# Patient Record
Sex: FEMALE | Race: WHITE | NOT HISPANIC OR LATINO | Employment: FULL TIME | URBAN - NONMETROPOLITAN AREA
[De-identification: names, ages, dates, MRNs, and addresses within clinical notes are randomized per-mention and may not be internally consistent; named-entity substitution may affect disease eponyms.]

---

## 2017-02-21 ENCOUNTER — TELEPHONE (OUTPATIENT)
Dept: OBSTETRICS AND GYNECOLOGY | Facility: CLINIC | Age: 32
End: 2017-02-21

## 2017-02-21 RX ORDER — NORGESTIMATE AND ETHINYL ESTRADIOL 0.25-0.035
1 KIT ORAL DAILY
Qty: 28 TABLET | Refills: 7 | Status: SHIPPED | OUTPATIENT
Start: 2017-02-21 | End: 2017-03-21

## 2017-02-21 NOTE — TELEPHONE ENCOUNTER
----- Message from Abby Holman sent at 2/21/2017  9:23 AM EST -----  Contact: PT  THIS IS CHERELLE'S PT.  SHE ISN'T DUE FOR ANNUAL UNTIL October, BUT ASKED IF WE CAN SEND IN REFILLS OF S[facility] TO [facility] IN [place].  THANKS

## 2017-02-24 DIAGNOSIS — N63.0 BREAST MASS: Primary | ICD-10-CM

## 2017-03-31 ENCOUNTER — OFFICE VISIT (OUTPATIENT)
Dept: OBSTETRICS AND GYNECOLOGY | Facility: CLINIC | Age: 32
End: 2017-03-31

## 2017-03-31 VITALS
HEIGHT: 60 IN | DIASTOLIC BLOOD PRESSURE: 72 MMHG | SYSTOLIC BLOOD PRESSURE: 118 MMHG | WEIGHT: 145 LBS | BODY MASS INDEX: 28.47 KG/M2

## 2017-03-31 DIAGNOSIS — A59.01 TRICHOMONAS VAGINITIS: Primary | ICD-10-CM

## 2017-03-31 PROCEDURE — 87210 SMEAR WET MOUNT SALINE/INK: CPT | Performed by: PHYSICIAN ASSISTANT

## 2017-03-31 PROCEDURE — 99213 OFFICE O/P EST LOW 20 MIN: CPT | Performed by: PHYSICIAN ASSISTANT

## 2017-03-31 RX ORDER — METRONIDAZOLE 500 MG/1
500 TABLET ORAL 2 TIMES DAILY
Qty: 14 TABLET | Refills: 0 | Status: SHIPPED | OUTPATIENT
Start: 2017-03-31 | End: 2017-04-07

## 2017-03-31 RX ORDER — NORGESTIMATE AND ETHINYL ESTRADIOL 7DAYSX3 28
1 KIT ORAL DAILY
COMMUNITY
End: 2017-08-03 | Stop reason: ALTCHOICE

## 2017-03-31 NOTE — PROGRESS NOTES
"Subjective   Chief Complaint   Patient presents with   • Vaginal Discharge   • Difficulty Urinating     occasional     /72  Ht 60\" (152.4 cm)  Wt 145 lb (65.8 kg)  LMP 2017  BMI 28.32 kg/m2   Dinesh Valera is a 31 y.o. year old  presenting to be seen for vaginal irritation and discharge  Started about 1 month ago as was returning from the beach  Had itching and discharge-though was yeast and took one diflucan from a friend--symptoms persist but now heavier yellow discharge and itching       Past Medical History:   Diagnosis Date   • Hypertension during pregnancy    • Hypokalemia    • Lactose intolerance    • Lactose intolerance in adult    • Migraines    • Ovarian cyst    • TIA (transient ischemic attack)    • Vision problems         Current Outpatient Prescriptions:   •  norgestimate-ethinyl estradiol (ORTHO TRI-CYCLEN, 28,) 0.18/0.215/0.25 MG-35 MCG per tablet, Take 1 tablet by mouth Daily., Disp: , Rfl:   •  azithromycin (ZITHROMAX Z-RUCHI) 250 MG tablet, Take 2 tablets the first day, then 1 tablet daily for 4 days., Disp: 6 tablet, Rfl: 0  •  metroNIDAZOLE (FLAGYL) 500 MG tablet, Take 1 tablet by mouth 2 (Two) Times a Day for 7 days., Disp: 14 tablet, Rfl: 0   No Known Allergies   Past Surgical History:   Procedure Laterality Date   •  SECTION     • CHOLECYSTECTOMY        Social History     Social History   • Marital status:      Spouse name: N/A   • Number of children: N/A   • Years of education: N/A     Occupational History   • Not on file.     Social History Main Topics   • Smoking status: Never Smoker   • Smokeless tobacco: Not on file   • Alcohol use No   • Drug use: No   • Sexual activity: Defer     Other Topics Concern   • Not on file     Social History Narrative      Family History   Problem Relation Age of Onset   • Fabry's disease Mother    • Hypertension Mother    • Rheum arthritis Mother    • Arthritis Father    • Diabetes Father    • " Migraines Father    • Diabetes Brother    • Migraines Brother    • Fabry's disease Brother    • Seizures Daughter    • Basal cell carcinoma Paternal Grandfather    • Colon polyps Paternal Grandfather    • Diabetes Other      Grandmother   • Skin cancer Other      Grandfather, Uncle   • Stomach cancer Other      Grandfather   • Basal cell carcinoma Paternal Uncle    • Colon cancer Maternal Grandfather    • Stomach cancer Maternal Grandfather    • Liver cancer Neg Hx    • Liver disease Neg Hx    • Esophageal cancer Neg Hx        The following portions of the patient's history were reviewed and updated as appropriate:problem list, current medications, allergies, past family history, past medical history, past social history and past surgical history.         Objective     Physical Exam   Constitutional: She appears well-developed and well-nourished.   Abdominal: Soft. Normal appearance. She exhibits no distension and no mass. There is no tenderness.   Genitourinary: Uterus normal. There is no tenderness or lesion on the right labia. There is no tenderness or lesion on the left labia. Cervix exhibits discharge. Cervix exhibits no motion tenderness. Right adnexum displays no mass and no tenderness. Left adnexum displays no mass and no tenderness. There is erythema in the vagina. Vaginal discharge found.   Genitourinary Comments: Copious yellow green discharge and tissue red c/w trichomonas  Wet mount confirms trichomonas   Skin: Skin is warm, dry and intact.   Psychiatric: She has a normal mood and affect. Her behavior is normal.     Dinesh was seen today for vaginal discharge and difficulty urinating.    Diagnoses and all orders for this visit:    Trichomonas vaginitis    Other orders  -     metroNIDAZOLE (FLAGYL) 500 MG tablet; Take 1 tablet by mouth 2 (Two) Times a Day for 7 days.             This note was electronically signed.    Ramya Rodriguez PA-C   March 31, 2017

## 2017-04-05 ENCOUNTER — TELEPHONE (OUTPATIENT)
Dept: OBSTETRICS AND GYNECOLOGY | Facility: CLINIC | Age: 32
End: 2017-04-05

## 2017-04-05 NOTE — TELEPHONE ENCOUNTER
Media Information    File:               Notice:         Please inform patient cultures positive for BV also in addition to trichomonas found in office-she was treated with Flagyl and will need no further antibiotics.         Thanks         Carmen         ----- Message -----            From: Abby Holman            Sent: 4/4/2017   4:27 PM              To: Ramya Rodriguez PA-C         Subject: Move                                                           The image below was moved on 4/4/2017 4:27 PM by Abby Holman [[id number]]. It was moved from the following: Chlamydia trachomatis, Neisseria gonorrhoeae, Trichomonas vaginalis, PCR on 3/31/2017. It was moved to the following: Chlamydia trachomatis, Neisseria gonorrhoeae, Trichomonas vaginalis, PCR on 3/31/2017, Bacterial Vaginosis, DEJUAN on 3/31/2017.                  Description        BV, STD CULTURE         Patient        Dinesh Valera         Document Type        LABORATORY - SCAN         Scan on 4/4/2017  4:27 PM by Abby Holman : BV, STD CULTURE         - per Carmen

## 2017-04-05 NOTE — TELEPHONE ENCOUNTER
Status Change Notification      Status From Status To Changed By     New Read Bobbi Interiano, MARIA INES on Wed Apr 5, 2017  8:46 AM       Media Information    File:               Notice:         Please inform patient cultures positive for BV also in addition to trichomonas found in office-she was treated with Flagyl and will need no further antibiotics.         Thanks         Carmen         ----- Message -----            From: Abby Holman            Sent: 4/4/2017   4:27 PM              To: Ramya Rodriguez PA-C         Subject: Move                                                           The image below was moved on 4/4/2017 4:27 PM by Abby Holman [[id number]]. It was moved from the following: Chlamydia trachomatis, Neisseria gonorrhoeae, Trichomonas vaginalis, PCR on 3/31/2017. It was moved to the following: Chlamydia trachomatis, Neisseria gonorrhoeae, Trichomonas vaginalis, PCR on 3/31/2017, Bacterial Vaginosis, DEJUAN on 3/31/2017.                  Description        BV, STD CULTURE         Patient        ValeraDinesh         Document Type        LABORATORY - SCAN         Scan on 4/4/2017  4:27 PM by Abby Holman : BV, STD CULTURE          per Carmen Rodriguez, PAC

## 2017-05-24 ENCOUNTER — OFFICE VISIT (OUTPATIENT)
Dept: SURGERY | Facility: CLINIC | Age: 32
End: 2017-05-24

## 2017-05-24 VITALS
BODY MASS INDEX: 29.41 KG/M2 | DIASTOLIC BLOOD PRESSURE: 84 MMHG | TEMPERATURE: 98.2 F | HEIGHT: 60 IN | SYSTOLIC BLOOD PRESSURE: 120 MMHG | HEART RATE: 78 BPM | OXYGEN SATURATION: 98 % | WEIGHT: 149.8 LBS

## 2017-05-24 DIAGNOSIS — N63.0 BREAST MASS: Primary | ICD-10-CM

## 2017-05-24 PROCEDURE — 99214 OFFICE O/P EST MOD 30 MIN: CPT | Performed by: SURGERY

## 2017-05-30 ENCOUNTER — OFFICE VISIT (OUTPATIENT)
Dept: OBSTETRICS AND GYNECOLOGY | Facility: CLINIC | Age: 32
End: 2017-05-30

## 2017-05-30 VITALS
SYSTOLIC BLOOD PRESSURE: 108 MMHG | HEIGHT: 60 IN | WEIGHT: 148 LBS | BODY MASS INDEX: 29.06 KG/M2 | DIASTOLIC BLOOD PRESSURE: 64 MMHG

## 2017-05-30 DIAGNOSIS — A59.9 TRICHOMONAS INFECTION: Primary | ICD-10-CM

## 2017-05-30 PROCEDURE — 99212 OFFICE O/P EST SF 10 MIN: CPT | Performed by: PHYSICIAN ASSISTANT

## 2017-06-02 NOTE — PROGRESS NOTES
"Subjective   Chief Complaint   Patient presents with   • Follow-up     JESSE     /64  Ht 60\" (152.4 cm)  Wt 148 lb (67.1 kg)  LMP 05/10/2017  BMI 28.9 kg/m2   Dinesh Valera is a 31 y.o. year old  presenting to be seen for follow up trichomonas infection and BV  Patient has completed her antibiotic  She reports she is feeling well and having no discharge but desires JESSE with cultures     Past Medical History:   Diagnosis Date   • Hypertension during pregnancy    • Hypokalemia    • Lactose intolerance    • Lactose intolerance in adult    • Migraines    • Ovarian cyst    • TIA (transient ischemic attack)    • Vision problems         Current Outpatient Prescriptions:   •  norgestimate-ethinyl estradiol (ORTHO TRI-CYCLEN, 28,) 0.18/0.215/0.25 MG-35 MCG per tablet, Take 1 tablet by mouth Daily., Disp: , Rfl:    Allergies   Allergen Reactions   • Norco [Hydrocodone-Acetaminophen]    • Percocet [Oxycodone-Acetaminophen]       Past Surgical History:   Procedure Laterality Date   •  SECTION     • CHOLECYSTECTOMY        Social History     Social History   • Marital status:      Spouse name: N/A   • Number of children: N/A   • Years of education: N/A     Occupational History   • Not on file.     Social History Main Topics   • Smoking status: Never Smoker   • Smokeless tobacco: Never Used   • Alcohol use No   • Drug use: No   • Sexual activity: Defer     Other Topics Concern   • Not on file     Social History Narrative      Family History   Problem Relation Age of Onset   • Fabry's disease Mother    • Hypertension Mother    • Rheum arthritis Mother    • Arthritis Father    • Diabetes Father    • Migraines Father    • Diabetes Brother    • Migraines Brother    • Fabry's disease Brother    • Seizures Daughter    • Basal cell carcinoma Paternal Grandfather    • Colon polyps Paternal Grandfather    • Diabetes Other      Grandmother   • Skin cancer Other      Grandfather, Uncle   • " Stomach cancer Other      Grandfather   • Basal cell carcinoma Paternal Uncle    • Colon cancer Maternal Grandfather    • Stomach cancer Maternal Grandfather    • Liver cancer Neg Hx    • Liver disease Neg Hx    • Esophageal cancer Neg Hx        The following portions of the patient's history were reviewed and updated as appropriate:problem list, current medications, allergies, past family history, past medical history, past social history and past surgical history.      Objective     Physical Exam   Constitutional: She appears well-developed and well-nourished. She is cooperative.   Genitourinary: Vagina normal. Cervix exhibits no motion tenderness, no discharge and no friability.   Neurological: She is alert.     Dinesh was seen today for follow-up.    Diagnoses and all orders for this visit:    Trichomonas infection  -     Trichomonas Vaginalis DNA Probe  -     Bacterial Vaginosis, DEJUAN          This note was electronically signed.    Ramya Rodriguez PA-C   June 2, 2017

## 2017-06-06 LAB
A VAGINAE DNA VAG QL NAA+PROBE: NORMAL SCORE
BVAB2 DNA VAG QL NAA+PROBE: NORMAL SCORE
MEGA1 DNA VAG QL NAA+PROBE: NORMAL SCORE
T VAGINALIS RRNA SPEC QL NAA+PROBE: NEGATIVE

## 2017-06-25 ENCOUNTER — HOSPITAL ENCOUNTER (EMERGENCY)
Facility: HOSPITAL | Age: 32
Discharge: HOME | End: 2017-06-25
Attending: EMERGENCY MEDICINE | Admitting: EMERGENCY MEDICINE

## 2017-06-25 ENCOUNTER — APPOINTMENT (OUTPATIENT)
Dept: CT IMAGING | Facility: HOSPITAL | Age: 32
End: 2017-06-25

## 2017-06-25 ENCOUNTER — APPOINTMENT (OUTPATIENT)
Dept: ULTRASOUND IMAGING | Facility: HOSPITAL | Age: 32
End: 2017-06-25

## 2017-06-25 VITALS
DIASTOLIC BLOOD PRESSURE: 99 MMHG | BODY MASS INDEX: 28.32 KG/M2 | HEART RATE: 71 BPM | TEMPERATURE: 98.3 F | RESPIRATION RATE: 16 BRPM | SYSTOLIC BLOOD PRESSURE: 134 MMHG | OXYGEN SATURATION: 100 % | WEIGHT: 150 LBS | HEIGHT: 61 IN

## 2017-06-25 DIAGNOSIS — N83.201 OVARIAN CYST, RIGHT: ICD-10-CM

## 2017-06-25 DIAGNOSIS — R10.30 LOWER ABDOMINAL PAIN, UNSPECIFIED: Primary | ICD-10-CM

## 2017-06-25 LAB
ALBUMIN SERPL-MCNC: 4.3 G/DL (ref 3.5–5)
ALBUMIN/GLOB SERPL: 1.2 G/DL (ref 1–2)
ALP SERPL-CCNC: 49 U/L (ref 38–126)
ALT SERPL W P-5'-P-CCNC: 24 U/L (ref 13–69)
ANION GAP SERPL CALCULATED.3IONS-SCNC: 14.8 MMOL/L
AST SERPL-CCNC: 18 U/L (ref 15–46)
B-HCG UR QL: NEGATIVE
BASOPHILS # BLD AUTO: 0.02 10*3/MM3 (ref 0–0.2)
BASOPHILS NFR BLD AUTO: 0.3 % (ref 0–2.5)
BILIRUB SERPL-MCNC: 0.5 MG/DL (ref 0.2–1.3)
BILIRUB UR QL STRIP: NEGATIVE
BUN BLD-MCNC: 13 MG/DL (ref 7–20)
BUN/CREAT SERPL: 18.6 (ref 7.1–23.5)
CALCIUM SPEC-SCNC: 9.4 MG/DL (ref 8.4–10.2)
CHLORIDE SERPL-SCNC: 105 MMOL/L (ref 98–107)
CLARITY UR: CLEAR
CO2 SERPL-SCNC: 26 MMOL/L (ref 26–30)
COLOR UR: YELLOW
CREAT BLD-MCNC: 0.7 MG/DL (ref 0.6–1.3)
DEPRECATED RDW RBC AUTO: 38.2 FL (ref 37–54)
EOSINOPHIL # BLD AUTO: 0.08 10*3/MM3 (ref 0–0.7)
EOSINOPHIL NFR BLD AUTO: 1.2 % (ref 0–7)
ERYTHROCYTE [DISTWIDTH] IN BLOOD BY AUTOMATED COUNT: 11.8 % (ref 11.5–14.5)
GFR SERPL CREATININE-BSD FRML MDRD: 99 ML/MIN/1.73
GLOBULIN UR ELPH-MCNC: 3.5 GM/DL
GLUCOSE BLD-MCNC: 94 MG/DL (ref 74–98)
GLUCOSE UR STRIP-MCNC: NEGATIVE MG/DL
HCT VFR BLD AUTO: 40.4 % (ref 37–47)
HGB BLD-MCNC: 13.9 G/DL (ref 12–16)
HGB UR QL STRIP.AUTO: NEGATIVE
HOLD SPECIMEN: NORMAL
HOLD SPECIMEN: NORMAL
IMM GRANULOCYTES # BLD: 0.01 10*3/MM3 (ref 0–0.06)
IMM GRANULOCYTES NFR BLD: 0.1 % (ref 0–0.6)
KETONES UR QL STRIP: NEGATIVE
LEUKOCYTE ESTERASE UR QL STRIP.AUTO: NEGATIVE
LIPASE SERPL-CCNC: 98 U/L (ref 23–300)
LYMPHOCYTES # BLD AUTO: 1.76 10*3/MM3 (ref 0.6–3.4)
LYMPHOCYTES NFR BLD AUTO: 26.1 % (ref 10–50)
MCH RBC QN AUTO: 30.5 PG (ref 27–31)
MCHC RBC AUTO-ENTMCNC: 34.4 G/DL (ref 30–37)
MCV RBC AUTO: 88.8 FL (ref 81–99)
MONOCYTES # BLD AUTO: 0.43 10*3/MM3 (ref 0–0.9)
MONOCYTES NFR BLD AUTO: 6.4 % (ref 0–12)
NEUTROPHILS # BLD AUTO: 4.45 10*3/MM3 (ref 2–6.9)
NEUTROPHILS NFR BLD AUTO: 65.9 % (ref 37–80)
NITRITE UR QL STRIP: NEGATIVE
NRBC BLD MANUAL-RTO: 0 /100 WBC (ref 0–0)
PH UR STRIP.AUTO: 6 [PH] (ref 5–8)
PLATELET # BLD AUTO: 240 10*3/MM3 (ref 130–400)
PMV BLD AUTO: 10 FL (ref 6–12)
POTASSIUM BLD-SCNC: 3.8 MMOL/L (ref 3.5–5.1)
PROT SERPL-MCNC: 7.8 G/DL (ref 6.3–8.2)
PROT UR QL STRIP: NEGATIVE
RBC # BLD AUTO: 4.55 10*6/MM3 (ref 4.2–5.4)
SODIUM BLD-SCNC: 142 MMOL/L (ref 137–145)
SP GR UR STRIP: 1.01 (ref 1–1.03)
UROBILINOGEN UR QL STRIP: NORMAL
WBC NRBC COR # BLD: 6.75 10*3/MM3 (ref 4.8–10.8)
WHOLE BLOOD HOLD SPECIMEN: NORMAL
WHOLE BLOOD HOLD SPECIMEN: NORMAL

## 2017-06-25 PROCEDURE — 81025 URINE PREGNANCY TEST: CPT | Performed by: PHYSICIAN ASSISTANT

## 2017-06-25 PROCEDURE — 85025 COMPLETE CBC W/AUTO DIFF WBC: CPT | Performed by: EMERGENCY MEDICINE

## 2017-06-25 PROCEDURE — 96361 HYDRATE IV INFUSION ADD-ON: CPT

## 2017-06-25 PROCEDURE — 96360 HYDRATION IV INFUSION INIT: CPT

## 2017-06-25 PROCEDURE — 99283 EMERGENCY DEPT VISIT LOW MDM: CPT

## 2017-06-25 PROCEDURE — 74177 CT ABD & PELVIS W/CONTRAST: CPT

## 2017-06-25 PROCEDURE — 0 DIATRIZOATE MEGLUMINE & SODIUM PER 1 ML: Performed by: EMERGENCY MEDICINE

## 2017-06-25 PROCEDURE — 81003 URINALYSIS AUTO W/O SCOPE: CPT | Performed by: EMERGENCY MEDICINE

## 2017-06-25 PROCEDURE — 76830 TRANSVAGINAL US NON-OB: CPT

## 2017-06-25 PROCEDURE — 0 IOPAMIDOL 61 % SOLUTION: Performed by: EMERGENCY MEDICINE

## 2017-06-25 PROCEDURE — 83690 ASSAY OF LIPASE: CPT | Performed by: EMERGENCY MEDICINE

## 2017-06-25 PROCEDURE — 80053 COMPREHEN METABOLIC PANEL: CPT | Performed by: EMERGENCY MEDICINE

## 2017-06-25 RX ORDER — SODIUM CHLORIDE 0.9 % (FLUSH) 0.9 %
10 SYRINGE (ML) INJECTION AS NEEDED
Status: DISCONTINUED | OUTPATIENT
Start: 2017-06-25 | End: 2017-06-25

## 2017-06-25 RX ORDER — SODIUM CHLORIDE 0.9 % (FLUSH) 0.9 %
10 SYRINGE (ML) INJECTION AS NEEDED
Status: DISCONTINUED | OUTPATIENT
Start: 2017-06-25 | End: 2017-06-25 | Stop reason: HOSPADM

## 2017-06-25 RX ORDER — IBUPROFEN 600 MG/1
600 TABLET ORAL EVERY 8 HOURS PRN
Qty: 12 TABLET | Refills: 0 | Status: SHIPPED | OUTPATIENT
Start: 2017-06-25 | End: 2017-10-18

## 2017-06-25 RX ADMIN — SODIUM CHLORIDE 1000 ML: 9 INJECTION, SOLUTION INTRAVENOUS at 16:25

## 2017-06-25 RX ADMIN — IOPAMIDOL 100 ML: 612 INJECTION, SOLUTION INTRAVENOUS at 18:14

## 2017-06-25 RX ADMIN — DIATRIZOATE MEGLUMINE AND DIATRIZOATE SODIUM 30 ML: 660; 100 LIQUID ORAL; RECTAL at 18:14

## 2017-06-25 NOTE — ED PROVIDER NOTES
Subjective   HPI Comments: Patient is here with gradual onset abdominal pain times onset 2 days ago continues to bother her with some right lower quadrant pain reported today denies fevers chills admits to some anorexia type symptoms no nausea or vomiting she does state that yesterday she seemed to be constipated and then today she did have some diarrhea she denies any systemic complaints no chest pain shortness of air no vaginal discharge she does endorse a history of ovarian cysts as well in the past but feels this seems different,.  Currently decreased pain only seems to bother her when she has her abdomen pushed upon      Review of Systems   Constitutional: Negative.    HENT: Negative.    Eyes: Negative.    Respiratory: Negative.    Cardiovascular: Negative.    Gastrointestinal: Positive for abdominal pain. Negative for nausea and vomiting.   Genitourinary: Negative.  Negative for flank pain and vaginal discharge.   Musculoskeletal: Negative.    Skin: Negative.    Neurological: Negative.    Hematological: Negative.    Psychiatric/Behavioral: Negative.    All other systems reviewed and are negative.      Past Medical History:   Diagnosis Date   • Hypertension during pregnancy    • Hypokalemia    • Lactose intolerance    • Lactose intolerance in adult    • Migraines    • Ovarian cyst    • TIA (transient ischemic attack)    • Vision problems 2015       Allergies   Allergen Reactions   • Norco [Hydrocodone-Acetaminophen]    • Percocet [Oxycodone-Acetaminophen]        Past Surgical History:   Procedure Laterality Date   •  SECTION     • CHOLECYSTECTOMY  2014       Family History   Problem Relation Age of Onset   • Fabry's disease Mother    • Hypertension Mother    • Rheum arthritis Mother    • Arthritis Father    • Diabetes Father    • Migraines Father    • Diabetes Brother    • Migraines Brother    • Fabry's disease Brother    • Seizures Daughter    • Basal cell carcinoma Paternal Grandfather    • Colon  polyps Paternal Grandfather    • Diabetes Other      Grandmother   • Skin cancer Other      Grandfather, Uncle   • Stomach cancer Other      Grandfather   • Basal cell carcinoma Paternal Uncle    • Colon cancer Maternal Grandfather    • Stomach cancer Maternal Grandfather    • Liver cancer Neg Hx    • Liver disease Neg Hx    • Esophageal cancer Neg Hx        Social History     Social History   • Marital status:      Spouse name: N/A   • Number of children: N/A   • Years of education: N/A     Social History Main Topics   • Smoking status: Never Smoker   • Smokeless tobacco: Never Used   • Alcohol use No   • Drug use: No   • Sexual activity: Defer     Other Topics Concern   • None     Social History Narrative           Objective   Physical Exam   Constitutional: She is oriented to person, place, and time. She appears well-developed and well-nourished.   HENT:   Head: Normocephalic.   Mouth/Throat: Oropharynx is clear and moist.   Eyes: EOM are normal. Pupils are equal, round, and reactive to light.   Neck: Normal range of motion. Neck supple.   Cardiovascular: Normal rate, regular rhythm, normal heart sounds and intact distal pulses.    Pulmonary/Chest: Effort normal and breath sounds normal.   Abdominal: Soft.   Mild tenderness periumbilical into the right lower quadrant no rebound or guarding appreciated   Musculoskeletal: Normal range of motion. She exhibits no edema.   Neurological: She is alert and oriented to person, place, and time.   Skin: Skin is warm and dry. No rash noted.   Psychiatric: She has a normal mood and affect. Her behavior is normal. Judgment and thought content normal.   Nursing note and vitals reviewed.      Procedures         ED Course  ED Course   Comment By Time   Patient has been resting comfortably without any acute distress Cezar Gunn PA-C 06/25 1809   CT scan was unremarkable as far as no signs of appendicitis mild enlargement of the right ovary Cezar Gunn PA-C  06/25 1911   She continues to rest comfortably no distress pending ultrasound report Cezar Gunn PA-C 06/25 1921   Patient no acute distress ambulatory back and forth to the restroom.. Pending Ultrasound report Cezar Gunn PA-C 06/25 2017   Awaiting ultrasound report Cezar Gunn PA-C 06/25 2045   Ultrasound demonstrated right ovary small 2 cm cyst that appears to have collapsed but is otherwise unremarkable left ovary is normal flow seen bilaterally on-call her Doppler imaging small amount of free fluid within the pelvis Cezar Gunn PA-C 06/25 2056                  MDM    Final diagnoses:   Lower abdominal pain, unspecified   Ovarian cyst, right            Cezar Gunn PA-C  06/25/17 2058

## 2017-08-03 ENCOUNTER — OFFICE VISIT (OUTPATIENT)
Dept: OBSTETRICS AND GYNECOLOGY | Facility: CLINIC | Age: 32
End: 2017-08-03

## 2017-08-03 VITALS
WEIGHT: 143 LBS | SYSTOLIC BLOOD PRESSURE: 110 MMHG | HEIGHT: 60 IN | BODY MASS INDEX: 28.07 KG/M2 | DIASTOLIC BLOOD PRESSURE: 72 MMHG

## 2017-08-03 DIAGNOSIS — N92.6 IRREGULAR MENSES: ICD-10-CM

## 2017-08-03 DIAGNOSIS — N83.201 CYST OF RIGHT OVARY: ICD-10-CM

## 2017-08-03 DIAGNOSIS — Z32.02 NEGATIVE PREGNANCY TEST: Primary | ICD-10-CM

## 2017-08-03 LAB
B-HCG UR QL: NEGATIVE
INTERNAL NEGATIVE CONTROL: NEGATIVE
INTERNAL POSITIVE CONTROL: POSITIVE
Lab: NORMAL

## 2017-08-03 PROCEDURE — 81025 URINE PREGNANCY TEST: CPT | Performed by: PHYSICIAN ASSISTANT

## 2017-08-03 PROCEDURE — 99213 OFFICE O/P EST LOW 20 MIN: CPT | Performed by: PHYSICIAN ASSISTANT

## 2017-08-03 RX ORDER — DESOGESTREL AND ETHINYL ESTRADIOL 0.15-0.03
1 KIT ORAL DAILY
Qty: 28 TABLET | Refills: 12 | Status: SHIPPED | OUTPATIENT
Start: 2017-08-03 | End: 2017-10-18

## 2017-08-03 NOTE — PROGRESS NOTES
"Subjective   Chief Complaint   Patient presents with   • Ovarian Cyst     Pt seen in ER  for ruptured cyst right side, feels like she may have another on same side   • Menstrual Problem     late on menses     /72  Ht 60\" (152.4 cm)  Wt 143 lb (64.9 kg)  LMP 2017  BMI 27.93 kg/m2   Dinesh Valera is a 32 y.o. year old  presenting to be seen for follow up right ovarian cyst  She was seen in the emergency room late  with RLQ pain and had CT scan and vaginal ultrasound-she was noted to have 2 cm right ovary cyst  She is also about 8 days late on menses-she has been on oc's but stopped these 2-3 months ago but wants to resume  She has used condoms-she has negative UPT today       Past Medical History:   Diagnosis Date   • Hypertension during pregnancy    • Hypokalemia    • Lactose intolerance    • Lactose intolerance in adult    • Migraines    • Ovarian cyst    • TIA (transient ischemic attack)    • Vision problems         Current Outpatient Prescriptions:   •  desogestrel-ethinyl estradiol (APRI) 0.15-30 MG-MCG per tablet, Take 1 tablet by mouth Daily., Disp: 28 tablet, Rfl: 12  •  ibuprofen (ADVIL,MOTRIN) 600 MG tablet, Take 1 tablet by mouth Every 8 (Eight) Hours As Needed for Mild Pain (1-3)., Disp: 12 tablet, Rfl: 0   Allergies   Allergen Reactions   • Norco [Hydrocodone-Acetaminophen]    • Percocet [Oxycodone-Acetaminophen]       Past Surgical History:   Procedure Laterality Date   •  SECTION     • CHOLECYSTECTOMY        Social History     Social History   • Marital status:      Spouse name: N/A   • Number of children: N/A   • Years of education: N/A     Occupational History   • Not on file.     Social History Main Topics   • Smoking status: Never Smoker   • Smokeless tobacco: Never Used   • Alcohol use No   • Drug use: No   • Sexual activity: Defer     Other Topics Concern   • Not on file     Social History Narrative      Family History   Problem " Relation Age of Onset   • Fabry's disease Mother    • Hypertension Mother    • Rheum arthritis Mother    • Arthritis Father    • Diabetes Father    • Migraines Father    • Diabetes Brother    • Migraines Brother    • Fabry's disease Brother    • Seizures Daughter    • Basal cell carcinoma Paternal Grandfather    • Colon polyps Paternal Grandfather    • Diabetes Other      Grandmother   • Skin cancer Other      Grandfather, Uncle   • Stomach cancer Other      Grandfather   • Basal cell carcinoma Paternal Uncle    • Colon cancer Maternal Grandfather    • Stomach cancer Maternal Grandfather    • Liver cancer Neg Hx    • Liver disease Neg Hx    • Esophageal cancer Neg Hx        The following portions of the patient's history were reviewed and updated as appropriate:problem list, current medications, allergies, past family history, past medical history, past social history and past surgical history.    Review of Systems   Constitutional: Negative.    Gastrointestinal: Negative.    Genitourinary: Positive for menstrual problem and pelvic pain.        Objective     Physical Exam   Constitutional: She appears well-developed and well-nourished. She is cooperative.   Abdominal: Soft. Normal appearance. There is no hepatosplenomegaly. There is no tenderness. There is no rigidity and no guarding.   Genitourinary: Vagina normal and uterus normal. There is no tenderness or lesion on the right labia. There is no tenderness or lesion on the left labia. Cervix exhibits no motion tenderness and no discharge. Right adnexum displays no mass and no tenderness. Left adnexum displays no mass and no tenderness.   Neurological: She is alert.   Skin: Skin is warm, dry and intact.   Psychiatric: She has a normal mood and affect. Her behavior is normal.     Dinesh was seen today for ovarian cyst and menstrual problem.    Diagnoses and all orders for this visit:    Negative pregnancy test  -     POC Pregnancy, Urine    Cyst of right  ovary    Irregular menses    Other orders  -     desogestrel-ethinyl estradiol (APRI) 0.15-30 MG-MCG per tablet; Take 1 tablet by mouth Daily.             This note was electronically signed.    Ramya Rodriguez PA-C   August 3, 2017

## 2017-10-18 ENCOUNTER — OFFICE VISIT (OUTPATIENT)
Dept: INTERNAL MEDICINE | Facility: CLINIC | Age: 32
End: 2017-10-18

## 2017-10-18 VITALS
OXYGEN SATURATION: 96 % | HEIGHT: 60 IN | BODY MASS INDEX: 28.86 KG/M2 | TEMPERATURE: 97.7 F | SYSTOLIC BLOOD PRESSURE: 112 MMHG | WEIGHT: 147 LBS | RESPIRATION RATE: 12 BRPM | HEART RATE: 87 BPM | DIASTOLIC BLOOD PRESSURE: 64 MMHG

## 2017-10-18 DIAGNOSIS — E55.9 VITAMIN D DEFICIENCY: ICD-10-CM

## 2017-10-18 DIAGNOSIS — R41.3 MEMORY CHANGES: ICD-10-CM

## 2017-10-18 DIAGNOSIS — M25.551 PAIN OF RIGHT HIP JOINT: ICD-10-CM

## 2017-10-18 DIAGNOSIS — R53.82 CHRONIC FATIGUE: Primary | ICD-10-CM

## 2017-10-18 PROCEDURE — 99214 OFFICE O/P EST MOD 30 MIN: CPT | Performed by: FAMILY MEDICINE

## 2017-10-18 NOTE — PATIENT INSTRUCTIONS
Discussed neurological symptoms, may need further imaging especially if pain recurs, further assessment to rule out MS. See eye doctor and chiropractor as scheduled. Records from previous provider requested. She's already had her flu shot this season.

## 2017-10-18 NOTE — PROGRESS NOTES
Subjective    Dinesh Valera is a 32 y.o. female here for:  Chief Complaint   Patient presents with   • Establish Care     fatigue     History of Present Illness   Feels run down. Struggles with memory, can't remember things well. This has been the case for a long time. Notes she moved a back board at [facility], then almost immediately realized she'd moved it when she thought It was gone. Also has trouble thinking of words. She sleeps great once she gets to sleep. They're trying to get the 4 yo daughter in her own bed. She's also helping high school daughter with school work. Sleeps 5-6 hours. Limited on time she can sleep. Can get to sleep without issue. Slept more this weekend, feels better this week than last week.  No known thyroid issues. She wonders about vitamin B12, does not think it's been checked. Has taken vitamin D replacement previously due to deficiency.    Has a history of optical migraines, scheduled to see eye doctor tomorrow for vision changes. Diagnosed a couple of years ago. Notes several years ago she had a severe pain in her head when she jumped up quickly. Vision became a bi tunnelled and was pre-syncopal. Held onto wall and held head. It went away as quickly as it came. Has had pain in heads almost as long as she can remember. She's not having them as frequently as she was, sharp pains on side of head (both sides), subsides with pressure when she puts hand on head. She cannot remember the last pain she had. She notes having MRI and MRA that was okay.     Mom has rheumatoid arthritis and lupus. Scheduled to see chiropractor on Monday. She notes hip pain on right but she thinks it's from sitting sideways in a swing. She note pain goes down from hip to knee. Sitting in floor worsens pain. She struggles to get up and walk. Did not hurt at all until the swing incident two months ago. No rashes.     The following portions of the patient's history were reviewed and updated as appropriate:  allergies, current medications, past family history, past medical history, past social history, past surgical history and problem list.    Review of Systems   Constitutional: Positive for activity change and fatigue.   HENT: Negative for trouble swallowing (improved with eliminating lactose from diet).    Eyes: Positive for visual disturbance.        Dry eyes       Vitals:    10/18/17 0932   BP: 112/64   Pulse: 87   Resp: 12   Temp: 97.7 °F (36.5 °C)   SpO2: 96%       Objective   Physical Exam   Constitutional: She is oriented to person, place, and time. Vital signs are normal. She appears well-developed and well-nourished. She is active. She does not have a sickly appearance. She does not appear ill.   Appears stated age. Well groomed. overweight.   HENT:   Head: Normocephalic and atraumatic. Hair is normal.   Right Ear: Hearing normal.   Left Ear: Hearing normal.   Nose: Nose normal.   Eyes: EOM and lids are normal. Pupils are equal, round, and reactive to light. No scleral icterus.   Neck: Phonation normal. Neck supple. No thyroid mass and no thyromegaly present.   Cardiovascular: Normal rate, regular rhythm and normal heart sounds.  Exam reveals no gallop and no friction rub.    No murmur heard.  Pulmonary/Chest: Effort normal and breath sounds normal.   Musculoskeletal: She exhibits no deformity.   Lymphadenopathy:     She has no cervical adenopathy.   Neurological: She is alert and oriented to person, place, and time. She displays no tremor. No cranial nerve deficit. Gait normal.   Skin: Skin is warm. No rash noted. She is not diaphoretic. No cyanosis. Nails show no clubbing.        Psychiatric: She has a normal mood and affect. Her speech is normal and behavior is normal. Judgment and thought content normal. Cognition and memory are normal.   Nursing note and vitals reviewed.      1/25/16 CT head wo contrast, unremarkable  No MRI head on file.     Assessment/Plan   Dinesh was seen today for establish  care.    Diagnoses and all orders for this visit:    Chronic fatigue  -     CBC & Differential  -     Comprehensive Metabolic Panel  -     TSH  -     T4, Free  -     Vitamin B12 & Folate  -     PEDRO  -     C-reactive Protein  -     Rheumatoid Factor, Quant  -     Sedimentation Rate    Vitamin D deficiency  -     Vitamin D 25 Hydroxy    Memory changes  -     TSH  -     T4, Free  -     Vitamin B12 & Folate    Pain of right hip joint  -     PEDRO  -     C-reactive Protein  -     Rheumatoid Factor, Quant  -     Sedimentation Rate      Discussed neurological symptoms, may need further imaging especially if pain recurs, further assessment to rule out MS. See eye doctor and chiropractor as scheduled. Records from previous provider requested. She's already had her flu shot this season.          Paloma Villeda MD

## 2017-10-19 LAB
25(OH)D3+25(OH)D2 SERPL-MCNC: 14.6 NG/ML
ALBUMIN SERPL-MCNC: 4.5 G/DL (ref 3.5–5)
ALBUMIN/GLOB SERPL: 1.4 G/DL (ref 1–2)
ALP SERPL-CCNC: 63 U/L (ref 38–126)
ALT SERPL-CCNC: 32 U/L (ref 13–69)
ANA SER QL: NEGATIVE
AST SERPL-CCNC: 24 U/L (ref 15–46)
BASOPHILS # BLD AUTO: 0.02 10*3/MM3 (ref 0–0.2)
BASOPHILS NFR BLD AUTO: 0.4 % (ref 0–2.5)
BILIRUB SERPL-MCNC: 0.6 MG/DL (ref 0.2–1.3)
BUN SERPL-MCNC: 14 MG/DL (ref 7–20)
BUN/CREAT SERPL: 20 (ref 7.1–23.5)
CALCIUM SERPL-MCNC: 9.7 MG/DL (ref 8.4–10.2)
CHLORIDE SERPL-SCNC: 104 MMOL/L (ref 98–107)
CO2 SERPL-SCNC: 25 MMOL/L (ref 26–30)
CREAT SERPL-MCNC: 0.7 MG/DL (ref 0.6–1.3)
CRP SERPL-MCNC: <0.5 MG/DL (ref 0–1)
EOSINOPHIL # BLD AUTO: 0.11 10*3/MM3 (ref 0–0.7)
EOSINOPHIL NFR BLD AUTO: 1.9 % (ref 0–7)
ERYTHROCYTE [DISTWIDTH] IN BLOOD BY AUTOMATED COUNT: 11.8 % (ref 11.5–14.5)
ERYTHROCYTE [SEDIMENTATION RATE] IN BLOOD BY WESTERGREN METHOD: 10 MM/HR (ref 0–20)
FOLATE SERPL-MCNC: >20 NG/ML
GFR SERPLBLD CREATININE-BSD FMLA CKD-EPI: 119 ML/MIN/1.73
GFR SERPLBLD CREATININE-BSD FMLA CKD-EPI: 98 ML/MIN/1.73
GLOBULIN SER CALC-MCNC: 3.3 GM/DL
GLUCOSE SERPL-MCNC: 87 MG/DL (ref 74–98)
HCT VFR BLD AUTO: 39.5 % (ref 37–47)
HGB BLD-MCNC: 13.3 G/DL (ref 12–16)
IMM GRANULOCYTES # BLD: 0.01 10*3/MM3 (ref 0–0.06)
IMM GRANULOCYTES NFR BLD: 0.2 % (ref 0–0.6)
LYMPHOCYTES # BLD AUTO: 1.76 10*3/MM3 (ref 0.6–3.4)
LYMPHOCYTES NFR BLD AUTO: 30.8 % (ref 10–50)
MCH RBC QN AUTO: 30.4 PG (ref 27–31)
MCHC RBC AUTO-ENTMCNC: 33.7 G/DL (ref 30–37)
MCV RBC AUTO: 90.2 FL (ref 81–99)
MONOCYTES # BLD AUTO: 0.43 10*3/MM3 (ref 0–0.9)
MONOCYTES NFR BLD AUTO: 7.5 % (ref 0–12)
NEUTROPHILS # BLD AUTO: 3.38 10*3/MM3 (ref 2–6.9)
NEUTROPHILS NFR BLD AUTO: 59.2 % (ref 37–80)
NRBC BLD AUTO-RTO: 0 /100 WBC (ref 0–0)
PLATELET # BLD AUTO: 257 10*3/MM3 (ref 130–400)
POTASSIUM SERPL-SCNC: 4.3 MMOL/L (ref 3.5–5.1)
PROT SERPL-MCNC: 7.8 G/DL (ref 6.3–8.2)
RBC # BLD AUTO: 4.38 10*6/MM3 (ref 4.2–5.4)
RHEUMATOID FACT SERPL-ACNC: <10 IU/ML (ref 0–13.9)
SODIUM SERPL-SCNC: 143 MMOL/L (ref 137–145)
T4 FREE SERPL-MCNC: 0.99 NG/DL (ref 0.78–2.19)
TSH SERPL DL<=0.005 MIU/L-ACNC: 2.63 MIU/ML (ref 0.47–4.68)
VIT B12 SERPL-MCNC: 545 PG/ML (ref 239–931)
WBC # BLD AUTO: 5.71 10*3/MM3 (ref 4.8–10.8)

## 2017-10-19 RX ORDER — ERGOCALCIFEROL 1.25 MG/1
50000 CAPSULE ORAL WEEKLY
Qty: 10 CAPSULE | Refills: 0 | Status: SHIPPED | OUTPATIENT
Start: 2017-10-19 | End: 2018-06-06 | Stop reason: SDUPTHER

## 2018-06-06 ENCOUNTER — OFFICE VISIT (OUTPATIENT)
Dept: INTERNAL MEDICINE | Facility: CLINIC | Age: 33
End: 2018-06-06

## 2018-06-06 VITALS
DIASTOLIC BLOOD PRESSURE: 62 MMHG | TEMPERATURE: 98.1 F | HEART RATE: 85 BPM | BODY MASS INDEX: 29.25 KG/M2 | HEIGHT: 60 IN | OXYGEN SATURATION: 98 % | SYSTOLIC BLOOD PRESSURE: 112 MMHG | WEIGHT: 149 LBS

## 2018-06-06 DIAGNOSIS — R20.0 FACIAL NUMBNESS: ICD-10-CM

## 2018-06-06 DIAGNOSIS — E55.9 VITAMIN D DEFICIENCY: ICD-10-CM

## 2018-06-06 DIAGNOSIS — M25.551 RIGHT HIP PAIN: ICD-10-CM

## 2018-06-06 DIAGNOSIS — G43.009 ATYPICAL MIGRAINE: Primary | ICD-10-CM

## 2018-06-06 DIAGNOSIS — Z86.73 HISTORY OF TIA (TRANSIENT ISCHEMIC ATTACK): ICD-10-CM

## 2018-06-06 PROCEDURE — 99214 OFFICE O/P EST MOD 30 MIN: CPT | Performed by: PHYSICIAN ASSISTANT

## 2018-06-06 RX ORDER — MELOXICAM 7.5 MG/1
7.5 TABLET ORAL DAILY
Qty: 14 TABLET | Refills: 0 | Status: SHIPPED | OUTPATIENT
Start: 2018-06-06 | End: 2018-06-15 | Stop reason: SDUPTHER

## 2018-06-06 RX ORDER — ERGOCALCIFEROL 1.25 MG/1
50000 CAPSULE ORAL WEEKLY
Qty: 12 CAPSULE | Refills: 3 | Status: SHIPPED | OUTPATIENT
Start: 2018-06-06 | End: 2018-12-24

## 2018-06-15 DIAGNOSIS — M25.551 RIGHT HIP PAIN: ICD-10-CM

## 2018-06-15 RX ORDER — MELOXICAM 7.5 MG/1
TABLET ORAL
Qty: 14 TABLET | Refills: 0 | Status: SHIPPED | OUTPATIENT
Start: 2018-06-15 | End: 2018-07-19 | Stop reason: SDUPTHER

## 2018-06-27 DIAGNOSIS — G89.29 CHRONIC NONINTRACTABLE HEADACHE, UNSPECIFIED HEADACHE TYPE: ICD-10-CM

## 2018-06-27 DIAGNOSIS — R51.9 CHRONIC NONINTRACTABLE HEADACHE, UNSPECIFIED HEADACHE TYPE: ICD-10-CM

## 2018-06-27 DIAGNOSIS — R20.0 FACIAL NUMBNESS: ICD-10-CM

## 2018-06-27 DIAGNOSIS — Z86.73 HISTORY OF TRANSIENT ISCHEMIC ATTACK (TIA): Primary | ICD-10-CM

## 2018-07-02 ENCOUNTER — HOSPITAL ENCOUNTER (OUTPATIENT)
Dept: MRI IMAGING | Facility: HOSPITAL | Age: 33
Discharge: HOME | End: 2018-07-02
Admitting: PHYSICIAN ASSISTANT

## 2018-07-02 DIAGNOSIS — R20.0 FACIAL NUMBNESS: ICD-10-CM

## 2018-07-02 DIAGNOSIS — Z86.73 HISTORY OF TRANSIENT ISCHEMIC ATTACK (TIA): ICD-10-CM

## 2018-07-02 DIAGNOSIS — R51.9 CHRONIC NONINTRACTABLE HEADACHE, UNSPECIFIED HEADACHE TYPE: ICD-10-CM

## 2018-07-02 DIAGNOSIS — G89.29 CHRONIC NONINTRACTABLE HEADACHE, UNSPECIFIED HEADACHE TYPE: ICD-10-CM

## 2018-07-02 PROCEDURE — 70553 MRI BRAIN STEM W/O & W/DYE: CPT

## 2018-07-02 PROCEDURE — A9577 INJ MULTIHANCE: HCPCS | Performed by: PHYSICIAN ASSISTANT

## 2018-07-02 PROCEDURE — 0 GADOBENATE DIMEGLUMINE 529 MG/ML SOLUTION: Performed by: PHYSICIAN ASSISTANT

## 2018-07-02 RX ADMIN — GADOBENATE DIMEGLUMINE 14 ML: 529 INJECTION, SOLUTION INTRAVENOUS at 07:20

## 2018-07-19 DIAGNOSIS — M25.551 RIGHT HIP PAIN: ICD-10-CM

## 2018-07-19 RX ORDER — MELOXICAM 7.5 MG/1
7.5 TABLET ORAL DAILY
Qty: 14 TABLET | Refills: 0 | Status: SHIPPED | OUTPATIENT
Start: 2018-07-19 | End: 2018-12-24

## 2018-09-07 ENCOUNTER — PATIENT MESSAGE (OUTPATIENT)
Dept: NEUROLOGY | Facility: CLINIC | Age: 33
End: 2018-09-07

## 2018-12-24 ENCOUNTER — OFFICE VISIT (OUTPATIENT)
Dept: RETAIL CLINIC | Facility: CLINIC | Age: 33
End: 2018-12-24

## 2018-12-24 VITALS
HEART RATE: 79 BPM | RESPIRATION RATE: 16 BRPM | WEIGHT: 156 LBS | OXYGEN SATURATION: 98 % | BODY MASS INDEX: 30.47 KG/M2 | TEMPERATURE: 98 F

## 2018-12-24 DIAGNOSIS — J03.90 TONSILLITIS: Primary | ICD-10-CM

## 2018-12-24 PROCEDURE — 99213 OFFICE O/P EST LOW 20 MIN: CPT | Performed by: NURSE PRACTITIONER

## 2018-12-24 RX ORDER — FLUCONAZOLE 150 MG/1
150 TABLET ORAL ONCE
Qty: 1 TABLET | Refills: 0 | Status: SHIPPED | OUTPATIENT
Start: 2018-12-24 | End: 2018-12-24

## 2018-12-24 RX ORDER — AMOXICILLIN 500 MG/1
500 CAPSULE ORAL 2 TIMES DAILY
Qty: 20 CAPSULE | Refills: 0 | Status: SHIPPED | OUTPATIENT
Start: 2018-12-24 | End: 2019-01-03

## 2018-12-24 NOTE — PATIENT INSTRUCTIONS
Tonsillitis  Tonsillitis is an infection of the throat that causes the tonsils to become red, tender, and swollen. Tonsils are collections of lymphoid tissue at the back of the throat. Each tonsil has crevices (crypts). Tonsils help fight nose and throat infections and keep infection from spreading to other parts of the body for the first 18 months of life.  What are the causes?  Sudden (acute) tonsillitis is usually caused by infection with streptococcal bacteria. Long-lasting (chronic) tonsillitis occurs when the crypts of the tonsils become filled with pieces of food and bacteria, which makes it easy for the tonsils to become repeatedly infected.  What are the signs or symptoms?  Symptoms of tonsillitis include:  · A sore throat, with possible difficulty swallowing.  · White patches on the tonsils.  · Fever.  · Tiredness.  · New episodes of snoring during sleep, when you did not snore before.  · Small, foul-smelling, yellowish-white pieces of material (tonsilloliths) that you occasionally cough up or spit out. The tonsilloliths can also cause you to have bad breath.    How is this diagnosed?  Tonsillitis can be diagnosed through a physical exam. Diagnosis can be confirmed with the results of lab tests, including a throat culture.  How is this treated?  The goals of tonsillitis treatment include the reduction of the severity and duration of symptoms and prevention of associated conditions. Symptoms of tonsillitis can be improved with the use of steroids to reduce the swelling. Tonsillitis caused by bacteria can be treated with antibiotic medicines. Usually, treatment with antibiotic medicines is started before the cause of the tonsillitis is known. However, if it is determined that the cause is not bacterial, antibiotic medicines will not treat the tonsillitis. If attacks of tonsillitis are severe and frequent, your health care provider may recommend surgery to remove the tonsils (tonsillectomy).  Follow these  instructions at home:  · Rest as much as possible and get plenty of sleep.  · Drink plenty of fluids. While the throat is very sore, eat soft foods or liquids, such as sherbet, soups, or instant breakfast drinks.  · Eat frozen ice pops.  · Gargle with a warm or cold liquid to help soothe the throat. Mix 1/4 teaspoon of salt and 1/4 teaspoon of baking soda in 8 oz of water.  Contact a health care provider if:  · Large, tender lumps develop in your neck.  · A rash develops.  · A green, yellow-brown, or bloody substance is coughed up.  · You are unable to swallow liquids or food for 24 hours.  · You notice that only one of the tonsils is swollen.  Get help right away if:  · You develop any new symptoms such as vomiting, severe headache, stiff neck, chest pain, or trouble breathing or swallowing.  · You have severe throat pain along with drooling or voice changes.  · You have severe pain, unrelieved with recommended medications.  · You are unable to fully open the mouth.  · You develop redness, swelling, or severe pain anywhere in the neck.  · You have a fever.  This information is not intended to replace advice given to you by your health care provider. Make sure you discuss any questions you have with your health care provider.  Document Released: 09/27/2006 Document Revised: 05/25/2017 Document Reviewed: 06/06/2014  [employer] Patient Education © 2017 [employer]

## 2019-03-09 ENCOUNTER — OFFICE VISIT (OUTPATIENT)
Dept: INTERNAL MEDICINE | Facility: CLINIC | Age: 34
End: 2019-03-09

## 2019-03-09 VITALS
BODY MASS INDEX: 30.23 KG/M2 | HEART RATE: 83 BPM | SYSTOLIC BLOOD PRESSURE: 136 MMHG | DIASTOLIC BLOOD PRESSURE: 89 MMHG | WEIGHT: 154 LBS | OXYGEN SATURATION: 100 % | RESPIRATION RATE: 16 BRPM | HEIGHT: 60 IN | TEMPERATURE: 98.2 F

## 2019-03-09 DIAGNOSIS — M25.551 RIGHT HIP PAIN: ICD-10-CM

## 2019-03-09 DIAGNOSIS — Z83.3 FAMILY HISTORY OF DIABETES MELLITUS: ICD-10-CM

## 2019-03-09 DIAGNOSIS — Z00.00 HEALTHCARE MAINTENANCE: ICD-10-CM

## 2019-03-09 DIAGNOSIS — R53.83 FATIGUE, UNSPECIFIED TYPE: ICD-10-CM

## 2019-03-09 DIAGNOSIS — R42 DIZZINESS: ICD-10-CM

## 2019-03-09 DIAGNOSIS — M79.651 RIGHT THIGH PAIN: Primary | ICD-10-CM

## 2019-03-09 PROCEDURE — 99214 OFFICE O/P EST MOD 30 MIN: CPT | Performed by: PHYSICIAN ASSISTANT

## 2019-03-09 RX ORDER — MELOXICAM 15 MG/1
15 TABLET ORAL DAILY
Qty: 30 TABLET | Refills: 5 | Status: SHIPPED | OUTPATIENT
Start: 2019-03-09 | End: 2019-12-18

## 2019-03-09 NOTE — PROGRESS NOTES
Subjective     Chief Complaint   Patient presents with   • Leg Pain     right deep pain    • Diabetes     concerned, dizziness       History of Present Illness     Dinesh Valera is a 33 y.o. female presenting with complaints of right lateral thigh pain and occasional right hip pain worsening over the past several months.  No injuries.  Minimal relief with ibuprofen or Tylenol.  Patient states it is painful for her to lay on her right side or to sit with her legs crossed.  Does state at one point she was told 1 of her legs was shorter than the other and she had to wear something in her shoe to help with this.  There is no burning, tingling or numbness.    Patient would also like to complete blood work.  States she is concerned she may be becoming diabetic as she feels worse when she eats anything with sugar.  States that multiple family members have type 2 diabetes.  Patient states even drinking coffee with creamer and it can make her feel poorly.  She is not exercising regularly.  She denies any polyuria, polydipsia, polyphagia.  No recent weight gain or weight loss.  At times she even feels dizzy.  She does have a history of both vitamin D and B12 deficiency but is not taking either at this point.    The following portions of the patient's history were reviewed and updated as appropriate: current medications, allergies, PMH.    Review of Systems   Constitutional: Positive for fatigue. Negative for appetite change, chills, fever and unexpected weight change.   HENT: Negative for congestion, ear pain, hearing loss, nosebleeds, sinus pressure, sore throat, tinnitus and trouble swallowing.    Eyes: Negative for pain, discharge, redness, itching and visual disturbance.   Respiratory: Negative for cough, chest tightness, shortness of breath and wheezing.    Cardiovascular: Negative for chest pain, palpitations and leg swelling.   Gastrointestinal: Negative for abdominal pain, blood in stool, constipation,  "diarrhea, nausea and vomiting.   Endocrine: Negative for cold intolerance, heat intolerance, polydipsia, polyphagia and polyuria.   Genitourinary: Negative for decreased urine volume, dysuria, flank pain, frequency and hematuria.   Musculoskeletal: Positive for arthralgias and myalgias. Negative for back pain, gait problem, joint swelling, neck pain and neck stiffness.   Skin: Negative for color change and rash.   Allergic/Immunologic: Negative for environmental allergies, food allergies and immunocompromised state.   Neurological: Positive for dizziness and light-headedness. Negative for syncope, weakness and headaches.   Hematological: Negative for adenopathy. Does not bruise/bleed easily.   Psychiatric/Behavioral: Negative for dysphoric mood, sleep disturbance and suicidal ideas. The patient is not nervous/anxious.        Objective     Vitals:    03/09/19 1103   BP: 136/89   Pulse: 83   Resp: 16   Temp: 98.2 °F (36.8 °C)   SpO2: 100%   Weight: 69.9 kg (154 lb)   Height: 152.4 cm (60\")     Physical Exam   Constitutional: She is oriented to person, place, and time. She appears well-developed and well-nourished.   HENT:   Nose: Nose normal.   Mouth/Throat: Oropharynx is clear and moist.   Eyes: EOM are normal. Pupils are equal, round, and reactive to light.   Neck: Normal range of motion. Neck supple.   Cardiovascular: Normal rate and regular rhythm.   Pulmonary/Chest: Effort normal and breath sounds normal.   Abdominal: Soft. Bowel sounds are normal.   Musculoskeletal: Normal range of motion.        Right upper leg: She exhibits tenderness. She exhibits no bony tenderness, no swelling and no deformity.   Pain to right lateral thigh becoming worse closer to knee.   Lymphadenopathy:     She has no cervical adenopathy.   Neurological: She is alert and oriented to person, place, and time.   Skin: Skin is warm and dry.   Psychiatric: She has a normal mood and affect.       Assessment/Plan     Diagnoses and all orders " for this visit:    Right thigh pain  -     Ambulatory Referral to Orthopedic Surgery  -     meloxicam (MOBIC) 15 MG tablet; Take 1 tablet by mouth Daily.    Refer to ortho for further evaluation. May be related to IT band.    Right hip pain  -     Ambulatory Referral to Orthopedic Surgery  -     meloxicam (MOBIC) 15 MG tablet; Take 1 tablet by mouth Daily.    Dizziness  -     Hemoglobin A1c; Future  -     CBC Auto Differential; Future  -     Comprehensive Metabolic Panel; Future  -     T4, Free; Future  -     TSH; Future  -     Vitamin B12; Future    Fatigue, unspecified type  -     Hemoglobin A1c; Future  -     CBC Auto Differential; Future  -     Comprehensive Metabolic Panel; Future  -     T4, Free; Future  -     TSH; Future  -     Vitamin D 25 hydroxy; Future  -     Vitamin B12; Future    Labs today to further evaluate dizziness, fatigue.  In the meantime, encouraged adequate water intake, healthy diet, exercising as tolerated.    Kelsey Chavis PA-C  03/09/2019         Please note that portions of this note were completed with a voice recognition program. Efforts were made to edit dictation, but occasionally words are mistranscribed.

## 2019-03-12 ENCOUNTER — RESULTS ENCOUNTER (OUTPATIENT)
Dept: INTERNAL MEDICINE | Facility: CLINIC | Age: 34
End: 2019-03-12

## 2019-03-12 DIAGNOSIS — R42 DIZZINESS: ICD-10-CM

## 2019-03-12 DIAGNOSIS — Z00.00 HEALTHCARE MAINTENANCE: ICD-10-CM

## 2019-03-12 DIAGNOSIS — Z83.3 FAMILY HISTORY OF DIABETES MELLITUS: ICD-10-CM

## 2019-03-12 DIAGNOSIS — R53.83 FATIGUE, UNSPECIFIED TYPE: ICD-10-CM

## 2019-12-18 ENCOUNTER — OFFICE VISIT (OUTPATIENT)
Dept: OBSTETRICS AND GYNECOLOGY | Facility: CLINIC | Age: 34
End: 2019-12-18

## 2019-12-18 VITALS
HEIGHT: 60 IN | SYSTOLIC BLOOD PRESSURE: 124 MMHG | WEIGHT: 160.4 LBS | BODY MASS INDEX: 31.49 KG/M2 | DIASTOLIC BLOOD PRESSURE: 80 MMHG

## 2019-12-18 DIAGNOSIS — N64.4 BREAST PAIN, LEFT: Primary | ICD-10-CM

## 2019-12-18 PROCEDURE — 99213 OFFICE O/P EST LOW 20 MIN: CPT | Performed by: PHYSICIAN ASSISTANT

## 2019-12-18 NOTE — PROGRESS NOTES
Subjective   Chief Complaint   Patient presents with   • Breast Problem     Pain in left breast       Dinesh Valera is a 34 y.o. year old  presenting to be seen for complaint of left breast pain which started 2 weeks ago.  Feeling achy and burning pain upper outer quadrant left breast. Has not felt breast lump. No nipple discharge  LMP 12/15/19  Is not on hormonal birth control         Past Medical History:   Diagnosis Date   • Allergic     seasonal   • Anxiety    • Heart murmur    • Hypertension during pregnancy    • Hypokalemia    • Lactose intolerance    • Lactose intolerance in adult    • Migraines    • Ovarian cyst    • Scoliosis    • Seizure-like activity (CMS/HCC)    • TIA (transient ischemic attack)    • Vision problems       No current outpatient medications on file.   Allergies   Allergen Reactions   • Norco [Hydrocodone-Acetaminophen] Nausea And Vomiting   • Percocet [Oxycodone-Acetaminophen] Nausea And Vomiting      Past Surgical History:   Procedure Laterality Date   •  SECTION     • CHOLECYSTECTOMY     • SKIN BIOPSY      back of neck on right and right chest, benign skin lesions      Social History     Socioeconomic History   • Marital status:      Spouse name: Not on file   • Number of children: Not on file   • Years of education: Not on file   • Highest education level: Not on file   Tobacco Use   • Smoking status: Never Smoker   • Smokeless tobacco: Never Used   Substance and Sexual Activity   • Alcohol use: No   • Drug use: No   • Sexual activity: Yes     Partners: Male     Birth control/protection: None      Family History   Problem Relation Age of Onset   • Fabry's disease Mother    • Hypertension Mother    • Rheum arthritis Mother    • Arthritis Father    • Diabetes Father    • Migraines Father    • Diabetes Brother    • Migraines Brother    • Fabry's disease Brother    • Seizures Daughter    • Basal cell carcinoma Paternal Grandfather    • Colon polyps  "Paternal Grandfather    • Cancer Paternal Grandfather         Skin   • Diabetes Other         Grandmother   • Skin cancer Other         Grandfather, Uncle   • Stomach cancer Other         Grandfather   • Basal cell carcinoma Paternal Uncle    • Colon cancer Maternal Grandfather    • Stomach cancer Maternal Grandfather    • Cancer Maternal Grandfather         Stomach/colon   • Asthma Brother    • Diabetes Brother    • Asthma Daughter    • Cancer Maternal Grandmother         skin-basal   • Diabetes Maternal Grandmother    • Cancer Paternal Uncle         skin   • Liver cancer Neg Hx    • Liver disease Neg Hx    • Esophageal cancer Neg Hx        Review of Systems   Constitutional: Negative for appetite change, chills and fever.   Gastrointestinal: Negative.    Genitourinary: Negative for difficulty urinating, dysuria, menstrual problem and pelvic pain.           Objective   /80   Ht 152.4 cm (60\")   Wt 72.8 kg (160 lb 6.4 oz)   LMP 12/15/2019 (Exact Date)   Breastfeeding No   BMI 31.33 kg/m²     Physical Exam   Constitutional: She appears well-developed and well-nourished. She is cooperative. No distress.   Pulmonary/Chest: Right breast exhibits no inverted nipple, no mass, no nipple discharge and no skin change. Left breast exhibits tenderness. Left breast exhibits no inverted nipple, no mass, no nipple discharge and no skin change.   Breast tissue slightly cystic and ropey and tender at 2-3 o'clock left breast but no nodules or lumps felt      Neurological: She is alert.   Skin: Skin is warm and dry. No lesion and no rash noted.   Psychiatric: She has a normal mood and affect. Her behavior is normal. Thought content normal.            Assessment and Plan  Dinesh was seen today for breast problem.    Diagnoses and all orders for this visit:    Breast pain, left  -     US Breast Left Complete; Future      Patient Instructions   Patient reassured no concerning abnormalities noted but will proceed with left " breast ultrasound.              This note was electronically signed.    Ramya Rodriguez PA-C   December 18, 2019

## 2019-12-18 NOTE — PATIENT INSTRUCTIONS
Patient reassured no concerning abnormalities noted but will proceed with left breast ultrasound.

## 2019-12-26 ENCOUNTER — HOSPITAL ENCOUNTER (OUTPATIENT)
Dept: ULTRASOUND IMAGING | Facility: HOSPITAL | Age: 34
Discharge: HOME | End: 2019-12-26
Admitting: PHYSICIAN ASSISTANT

## 2019-12-26 DIAGNOSIS — N64.4 BREAST PAIN, LEFT: ICD-10-CM

## 2019-12-26 PROCEDURE — 76641 ULTRASOUND BREAST COMPLETE: CPT

## 2020-02-14 ENCOUNTER — OFFICE VISIT (OUTPATIENT)
Dept: OBSTETRICS AND GYNECOLOGY | Facility: CLINIC | Age: 35
End: 2020-02-14

## 2020-02-14 VITALS
DIASTOLIC BLOOD PRESSURE: 82 MMHG | WEIGHT: 161.6 LBS | BODY MASS INDEX: 31.73 KG/M2 | HEIGHT: 60 IN | SYSTOLIC BLOOD PRESSURE: 122 MMHG

## 2020-02-14 DIAGNOSIS — R10.31 RIGHT LOWER QUADRANT PAIN: Primary | ICD-10-CM

## 2020-02-14 PROCEDURE — 99213 OFFICE O/P EST LOW 20 MIN: CPT | Performed by: PHYSICIAN ASSISTANT

## 2020-02-14 NOTE — PROGRESS NOTES
Subjective   Chief Complaint   Patient presents with   • Pelvic Pain     Pain on right side x 3 weeks       Dinesh Valera is a 34 y.o. year old  presenting to be seen for complaint of right lower pelvic pain which is been going on for 3 weeks.  The pain is crampy in nature and has been there most every day however it is not a constant continuous throughout the day pain.  Her last menstrual period was 2020.  She is using rhythm for birth control.  And she has very regular cycles q. 28 days.  She has a history of ovarian cyst that have resolved and she has never had to have an ovarian cyst surgically removed.  He has had no nausea or vomiting, no fever chills, no urinary symptoms or change in bowel habits.    Past Medical History:   Diagnosis Date   • Allergic     seasonal   • Anxiety    • Heart murmur    • Hypertension during pregnancy    • Hypokalemia    • Lactose intolerance    • Lactose intolerance in adult    • Migraines    • Ovarian cyst    • Scoliosis    • Seizure-like activity (CMS/HCC)    • TIA (transient ischemic attack)    • Vision problems       No current outpatient medications on file.   Allergies   Allergen Reactions   • Norco [Hydrocodone-Acetaminophen] Nausea And Vomiting   • Percocet [Oxycodone-Acetaminophen] Nausea And Vomiting      Past Surgical History:   Procedure Laterality Date   •  SECTION     • CHOLECYSTECTOMY     • SKIN BIOPSY      back of neck on right and right chest, benign skin lesions      Social History     Socioeconomic History   • Marital status:      Spouse name: Not on file   • Number of children: Not on file   • Years of education: Not on file   • Highest education level: Not on file   Tobacco Use   • Smoking status: Never Smoker   • Smokeless tobacco: Never Used   Substance and Sexual Activity   • Alcohol use: No   • Drug use: No   • Sexual activity: Yes     Partners: Male     Birth control/protection: None      Family History  "  Problem Relation Age of Onset   • Fabry's disease Mother    • Hypertension Mother    • Rheum arthritis Mother    • Arthritis Father    • Diabetes Father    • Migraines Father    • Diabetes Brother    • Migraines Brother    • Fabry's disease Brother    • Seizures Daughter    • Basal cell carcinoma Paternal Grandfather    • Colon polyps Paternal Grandfather    • Cancer Paternal Grandfather         Skin   • Diabetes Other         Grandmother   • Skin cancer Other         Grandfather, Uncle   • Stomach cancer Other         Grandfather   • Basal cell carcinoma Paternal Uncle    • Colon cancer Maternal Grandfather    • Stomach cancer Maternal Grandfather    • Cancer Maternal Grandfather         Stomach/colon   • Asthma Brother    • Diabetes Brother    • Asthma Daughter    • Cancer Maternal Grandmother         skin-basal   • Diabetes Maternal Grandmother    • Cancer Paternal Uncle         skin   • Liver cancer Neg Hx    • Liver disease Neg Hx    • Esophageal cancer Neg Hx        Review of Systems   Constitutional: Negative for appetite change, chills, diaphoresis and fever.   Gastrointestinal: Positive for abdominal pain. Negative for constipation, diarrhea, nausea and vomiting.   Genitourinary: Positive for pelvic pain. Negative for difficulty urinating, dysuria, flank pain, hematuria, menstrual problem and vaginal discharge.           Objective   /82   Ht 152.4 cm (60\")   Wt 73.3 kg (161 lb 9.6 oz)   LMP 02/09/2020 (Exact Date)   Breastfeeding No   BMI 31.56 kg/m²     Physical Exam   Constitutional: She appears well-developed and well-nourished. She is cooperative. No distress.   Eyes: Conjunctivae, EOM and lids are normal.   Abdominal: Soft. Normal appearance. She exhibits no distension. There is no tenderness. There is no rigidity and no guarding.   Genitourinary: Uterus normal. There is no tenderness or lesion on the right labia. There is no tenderness or lesion on the left labia. Cervix exhibits no " motion tenderness, no discharge and no friability. Right adnexum displays no mass and no tenderness. Left adnexum displays no mass and no tenderness. No erythema or tenderness in the vagina. No vaginal discharge found.   Neurological: She is alert.   Skin: Skin is warm and dry. No lesion and no rash noted.   Psychiatric: She has a normal mood and affect. Her behavior is normal. Thought content normal.            Assessment and Plan  Dinesh was seen today for pelvic pain.    Diagnoses and all orders for this visit:    Right lower quadrant pain      Patient Instructions   Patient is reassured her pelvic exam is normal.  She is advised that she may have had an ovarian cyst that has resolved and with no significant pain at this time and nothing felt on pelvic exam we will observe at this time.  She is instructed to call the office to schedule a pelvic ultrasound should her symptoms continue over the next 3 to 4 days or if she has increased pelvic pain or other concerns.             This note was electronically signed.    Ramya Rodriguez PA-C   February 17, 2020

## 2020-02-17 NOTE — PATIENT INSTRUCTIONS
Patient is reassured her pelvic exam is normal.  She is advised that she may have had an ovarian cyst that has resolved and with no significant pain at this time and nothing felt on pelvic exam we will observe at this time.  She is instructed to call the office to schedule a pelvic ultrasound should her symptoms continue over the next 3 to 4 days or if she has increased pelvic pain or other concerns.

## 2020-03-11 ENCOUNTER — OFFICE VISIT (OUTPATIENT)
Dept: OBSTETRICS AND GYNECOLOGY | Facility: CLINIC | Age: 35
End: 2020-03-11

## 2020-03-11 VITALS
DIASTOLIC BLOOD PRESSURE: 88 MMHG | BODY MASS INDEX: 31.94 KG/M2 | SYSTOLIC BLOOD PRESSURE: 124 MMHG | HEIGHT: 60 IN | WEIGHT: 162.7 LBS

## 2020-03-11 DIAGNOSIS — R10.31 RIGHT LOWER QUADRANT PAIN: Primary | ICD-10-CM

## 2020-03-11 PROCEDURE — 99214 OFFICE O/P EST MOD 30 MIN: CPT | Performed by: PHYSICIAN ASSISTANT

## 2020-03-11 RX ORDER — LEVONORGESTREL AND ETHINYL ESTRADIOL 0.1-0.02MG
1 KIT ORAL DAILY
Qty: 28 TABLET | Refills: 6 | Status: SHIPPED | OUTPATIENT
Start: 2020-03-11 | End: 2020-09-21

## 2020-03-11 NOTE — PATIENT INSTRUCTIONS
Given history of one previous  section adhesions would be a possible cause of pain but patient counseled that this is diagnosed only per diagnostic laparoscopy.   Discussed trial of ovarian suppression hormonally with low dose oc and patient would like to try this. May start oc;s with current cycle and follow up 3 months or prn. Take oc's consistently

## 2020-03-11 NOTE — PROGRESS NOTES
Subjective   Chief Complaint   Patient presents with   • Follow-up     TVS for right lower quadrant pain, CT done at [facility] last week.  CT scan is in chart       Dinesh Valera is a 34 y.o. year old  presenting to be seen for follow up pelvic pain and endometrial thickening and small left ovarian cyst.  Patient was seen in office one month ago with complaint of right lower quadrant pain that had onset 3 weeks before visit. She had normal pelvic exam on visit and we elected to observe symptoms for a bit and let her go through next menstrual cycle. She had increased pain a few days after visit and had pelvic ultrasound done at [facility] 3/2/2020. A copy of ultrasound was obtained and noted normal uterus, endometrium 17 mm( which was 3-4 days preceding menses), and a 2.5 cm left ovarian cyst. The radiologist had stated on report that endometrium was too thick for patient age which was concerning to patient and after discussion we elected to return to office after menses for repeat TVS.  She reports that late last week she had increased pain RLQ and was seen at [facility] and had CT scan done 3/5/2020 which was negative and ruled out appendicitis. A copy of CT scan is not available at time of visit.   LMP 3/8/2020. Patient using withdrawal for contraception  Pelvic ultrasound done today notes normal anteverted uterus, endometrium 9 mm, bilateral ovaries with small follicles, no free fluid    Patient reports she is not having any pain today but was experiencing pain up until 2 days ago.       Past Medical History:   Diagnosis Date   • Allergic     seasonal   • Anxiety    • Heart murmur    • Hypertension during pregnancy    • Hypokalemia    • Lactose intolerance    • Lactose intolerance in adult    • Migraines    • Ovarian cyst    • Scoliosis    • Seizure-like activity (CMS/HCC)    • TIA (transient ischemic attack)    • Vision problems         Current Outpatient Medications:   •  levonorgestrel-ethinyl  estradiol (AVIANE,ALESSE,LESSINA) 0.1-20 MG-MCG per tablet, Take 1 tablet by mouth Daily., Disp: 28 tablet, Rfl: 6   Allergies   Allergen Reactions   • Norco [Hydrocodone-Acetaminophen] Nausea And Vomiting   • Percocet [Oxycodone-Acetaminophen] Nausea And Vomiting      Past Surgical History:   Procedure Laterality Date   •  SECTION     • CHOLECYSTECTOMY     • SKIN BIOPSY      back of neck on right and right chest, benign skin lesions      Social History     Socioeconomic History   • Marital status:      Spouse name: Not on file   • Number of children: Not on file   • Years of education: Not on file   • Highest education level: Not on file   Tobacco Use   • Smoking status: Never Smoker   • Smokeless tobacco: Never Used   Substance and Sexual Activity   • Alcohol use: No   • Drug use: No   • Sexual activity: Yes     Partners: Male     Birth control/protection: None      Family History   Problem Relation Age of Onset   • Fabry's disease Mother    • Hypertension Mother    • Rheum arthritis Mother    • Arthritis Father    • Diabetes Father    • Migraines Father    • Diabetes Brother    • Migraines Brother    • Fabry's disease Brother    • Seizures Daughter    • Basal cell carcinoma Paternal Grandfather    • Colon polyps Paternal Grandfather    • Cancer Paternal Grandfather         Skin   • Diabetes Other         Grandmother   • Skin cancer Other         Grandfather, Uncle   • Stomach cancer Other         Grandfather   • Basal cell carcinoma Paternal Uncle    • Colon cancer Maternal Grandfather    • Stomach cancer Maternal Grandfather    • Cancer Maternal Grandfather         Stomach/colon   • Asthma Brother    • Diabetes Brother    • Asthma Daughter    • Cancer Maternal Grandmother         skin-basal   • Diabetes Maternal Grandmother    • Cancer Paternal Uncle         skin   • Liver cancer Neg Hx    • Liver disease Neg Hx    • Esophageal cancer Neg Hx        Review of Systems   Constitutional:  "Negative for activity change, appetite change, chills, diaphoresis and fever.   Gastrointestinal: Negative for abdominal pain, constipation, diarrhea, nausea and vomiting.   Genitourinary: Positive for pelvic pain. Negative for difficulty urinating, dysuria, menstrual problem and vaginal discharge.           Objective   /88   Ht 152.4 cm (60\")   Wt 73.8 kg (162 lb 11.2 oz)   LMP 2020 (Exact Date)   Breastfeeding No   BMI 31.78 kg/m²     Physical Exam         Assessment and Plan  Dinesh was seen today for follow-up.    Diagnoses and all orders for this visit:    Right lower quadrant pain    Other orders  -     levonorgestrel-ethinyl estradiol (AVIANE,ALESSE,LESSINA) 0.1-20 MG-MCG per tablet; Take 1 tablet by mouth Daily.      Patient Instructions   Given history of one previous  section adhesions would be a possible cause of pain but patient counseled that this is diagnosed only per diagnostic laparoscopy.   Discussed trial of ovarian suppression hormonally with low dose oc and patient would like to try this. May start oc;s with current cycle and follow up 3 months or prn. Take oc's consistently               This note was electronically signed.    Ramya Rodriguez PA-C   2020  "

## 2020-05-18 ENCOUNTER — TELEPHONE (OUTPATIENT)
Dept: OBSTETRICS AND GYNECOLOGY | Facility: CLINIC | Age: 35
End: 2020-05-18

## 2020-05-18 NOTE — TELEPHONE ENCOUNTER
Spoke with patient. Sun sensitivity is a possibility with hormonal changes and advise her to put sunscreen on ahead of time if going to be in sun at all and see if this helps.

## 2020-05-18 NOTE — TELEPHONE ENCOUNTER
----- Message from Dinesh Valera sent at 5/17/2020 10:29 PM EDT -----  Regarding: Prescription Question  Contact: [phone]  Hi Carmen, I was last seen in March and you had put me on birth control for pain I was experiencing that may be related to adhesions. I am currently taking Orsythia. I'm am thinking this maybe causing sun sensitivity. Anytime I go out, within a few minutes I start to get a rash and is very itchy mainly on my arms and neck. Everything else is pretty covered. I couldn't figure out why it was happening and then tonight I realized maybe it could be my medication? I am not taking any other medications at all. Would this be a possibility? If not I may reach out to a dermatologist.

## 2020-06-10 ENCOUNTER — OFFICE VISIT (OUTPATIENT)
Dept: OBSTETRICS AND GYNECOLOGY | Facility: CLINIC | Age: 35
End: 2020-06-10

## 2020-06-10 VITALS
WEIGHT: 165.4 LBS | DIASTOLIC BLOOD PRESSURE: 82 MMHG | SYSTOLIC BLOOD PRESSURE: 124 MMHG | BODY MASS INDEX: 32.47 KG/M2 | HEIGHT: 60 IN

## 2020-06-10 DIAGNOSIS — Z30.41 ENCOUNTER FOR SURVEILLANCE OF CONTRACEPTIVE PILLS: Primary | ICD-10-CM

## 2020-06-10 PROCEDURE — 99212 OFFICE O/P EST SF 10 MIN: CPT | Performed by: PHYSICIAN ASSISTANT

## 2020-06-10 NOTE — PROGRESS NOTES
Subjective   Chief Complaint   Patient presents with   • Follow-up     3 month follow-up on oral contraceptives       Dinesh Valera is a 34 y.o. year old  presenting to be seen for follow-up pelvic pain and birth control pills.  She was seen a few months ago with complaints of intermittent pelvic pain that we thought was most likely of ovarian etiology and ovulation.  We elected to try a low-dose birth control pill to suppress her ovaries and patient is now in her third pack of IV on birth control pills.  She reports she has done very well since starting the birth control pills and it has stopped her pain.  Her cycles are regular and light on the IV on.  She has no new complaints or concerns.  Last menstrual period was 2020.    Past Medical History:   Diagnosis Date   • Allergic     seasonal   • Anxiety    • Heart murmur    • Hypertension during pregnancy    • Hypokalemia    • Lactose intolerance    • Lactose intolerance in adult    • Migraines    • Ovarian cyst    • Scoliosis    • Seizure-like activity (CMS/HCC)    • TIA (transient ischemic attack)    • Vision problems         Current Outpatient Medications:   •  levonorgestrel-ethinyl estradiol (AVIANE,ALESSE,LESSINA) 0.1-20 MG-MCG per tablet, Take 1 tablet by mouth Daily., Disp: 28 tablet, Rfl: 6   Allergies   Allergen Reactions   • Norco [Hydrocodone-Acetaminophen] Nausea And Vomiting   • Percocet [Oxycodone-Acetaminophen] Nausea And Vomiting      Past Surgical History:   Procedure Laterality Date   •  SECTION     • CHOLECYSTECTOMY     • SKIN BIOPSY      back of neck on right and right chest, benign skin lesions      Social History     Socioeconomic History   • Marital status:      Spouse name: Not on file   • Number of children: Not on file   • Years of education: Not on file   • Highest education level: Not on file   Tobacco Use   • Smoking status: Never Smoker   • Smokeless tobacco: Never Used   Substance and  "Sexual Activity   • Alcohol use: No   • Drug use: No   • Sexual activity: Yes     Partners: Male     Birth control/protection: OCP      Family History   Problem Relation Age of Onset   • Fabry's disease Mother    • Hypertension Mother    • Rheum arthritis Mother    • Arthritis Father    • Diabetes Father    • Migraines Father    • Diabetes Brother    • Migraines Brother    • Fabry's disease Brother    • Seizures Daughter    • Basal cell carcinoma Paternal Grandfather    • Colon polyps Paternal Grandfather    • Cancer Paternal Grandfather         Skin   • Diabetes Other         Grandmother   • Skin cancer Other         Grandfather, Uncle   • Stomach cancer Other         Grandfather   • Basal cell carcinoma Paternal Uncle    • Colon cancer Maternal Grandfather    • Stomach cancer Maternal Grandfather    • Cancer Maternal Grandfather         Stomach/colon   • Asthma Brother    • Diabetes Brother    • Asthma Daughter    • Cancer Maternal Grandmother         skin-basal   • Diabetes Maternal Grandmother    • Cancer Paternal Uncle         skin   • Liver cancer Neg Hx    • Liver disease Neg Hx    • Esophageal cancer Neg Hx        Review of Systems   Constitutional: Negative for activity change, chills, diaphoresis and fever.   Gastrointestinal: Negative for abdominal pain, nausea and vomiting.   Genitourinary: Negative for difficulty urinating, pelvic pain, vaginal bleeding and vaginal discharge.           Objective   /82   Ht 152.4 cm (60\")   Wt 75 kg (165 lb 6.4 oz)   LMP 06/01/2020 (Exact Date)   Breastfeeding No   BMI 32.30 kg/m²     Physical Exam         Assessment and Plan  Dinesh was seen today for follow-up.    Diagnoses and all orders for this visit:    Encounter for surveillance of contraceptive pills      Patient Instructions   We will continue the Aviane oc's at this point as patient has responded very well.  She is reminded to take her OCs consistently.  Recommend she follow-up in 2 to 3 months for " annual exam and Pap smear.             This note was electronically signed.    Ramya Rodriguez PA-C   Laura 10, 2020

## 2020-06-10 NOTE — PATIENT INSTRUCTIONS
We will continue the Aviane oc's at this point as patient has responded very well.  She is reminded to take her OCs consistently.  Recommend she follow-up in 2 to 3 months for annual exam and Pap smear.

## 2020-09-21 RX ORDER — LEVONORGESTREL AND ETHINYL ESTRADIOL 0.1-0.02MG
KIT ORAL
Qty: 28 TABLET | Refills: 0 | Status: SHIPPED | OUTPATIENT
Start: 2020-09-21 | End: 2020-11-20 | Stop reason: SDUPTHER

## 2020-11-20 ENCOUNTER — OFFICE VISIT (OUTPATIENT)
Dept: OBSTETRICS AND GYNECOLOGY | Facility: CLINIC | Age: 35
End: 2020-11-20

## 2020-11-20 VITALS
SYSTOLIC BLOOD PRESSURE: 110 MMHG | BODY MASS INDEX: 33.06 KG/M2 | WEIGHT: 168.4 LBS | DIASTOLIC BLOOD PRESSURE: 80 MMHG | HEIGHT: 60 IN

## 2020-11-20 DIAGNOSIS — Z01.419 ENCOUNTER FOR GYNECOLOGICAL EXAMINATION WITHOUT ABNORMAL FINDING: Primary | ICD-10-CM

## 2020-11-20 DIAGNOSIS — Z12.4 SCREENING FOR CERVICAL CANCER: ICD-10-CM

## 2020-11-20 DIAGNOSIS — Z30.41 ENCOUNTER FOR SURVEILLANCE OF CONTRACEPTIVE PILLS: ICD-10-CM

## 2020-11-20 PROCEDURE — 99395 PREV VISIT EST AGE 18-39: CPT | Performed by: PHYSICIAN ASSISTANT

## 2020-11-20 RX ORDER — LISINOPRIL AND HYDROCHLOROTHIAZIDE 12.5; 1 MG/1; MG/1
1 TABLET ORAL DAILY
COMMUNITY
Start: 2020-11-04

## 2020-11-20 RX ORDER — LEVONORGESTREL AND ETHINYL ESTRADIOL 0.1-0.02MG
KIT ORAL
Qty: 28 TABLET | Refills: 12 | Status: SHIPPED | OUTPATIENT
Start: 2020-11-20

## 2020-11-20 NOTE — PROGRESS NOTES
Subjective   Chief Complaint   Patient presents with   • Gynecologic Exam     Patient is here for annual and pap smear       Dinesh Valera is a 35 y.o. year old  presenting to be seen for her annual gynecological exam.   She has no complaints or concerns  She desires refills of Aviane ocp  LMP 2020. Has very light bleed with Aviane     Past Medical History:   Diagnosis Date   • Allergic     seasonal   • Anxiety    • Heart murmur    • Hypertension during pregnancy    • Hypokalemia    • Lactose intolerance    • Lactose intolerance in adult    • Migraines    • Ovarian cyst    • Scoliosis    • Seizure-like activity (CMS/HCC)    • TIA (transient ischemic attack)    • Vision problems         Current Outpatient Medications:   •  levonorgestrel-ethinyl estradiol (Vienva) 0.1-20 MG-MCG per tablet, One tablet po daily, Disp: 28 tablet, Rfl: 12  •  lisinopril-hydrochlorothiazide (PRINZIDE,ZESTORETIC) 10-12.5 MG per tablet, Take 1 tablet by mouth Daily., Disp: , Rfl:    Allergies   Allergen Reactions   • Norco [Hydrocodone-Acetaminophen] Nausea And Vomiting   • Percocet [Oxycodone-Acetaminophen] Nausea And Vomiting      Past Surgical History:   Procedure Laterality Date   •  SECTION     • CHOLECYSTECTOMY     • SKIN BIOPSY      back of neck on right and right chest, benign skin lesions      Social History     Socioeconomic History   • Marital status:      Spouse name: Not on file   • Number of children: Not on file   • Years of education: Not on file   • Highest education level: Not on file   Social Needs   • Financial resource strain: Not hard at all   • Food insecurity     Worry: Never true     Inability: Never true   • Transportation needs     Medical: No     Non-medical: No   Tobacco Use   • Smoking status: Never Smoker   • Smokeless tobacco: Never Used   Substance and Sexual Activity   • Alcohol use: No   • Drug use: No   • Sexual activity: Yes     Partners: Male     Birth  "control/protection: OCP   Lifestyle   • Physical activity     Days per week: 0 days     Minutes per session: 0 min   • Stress: Only a little      Family History   Problem Relation Age of Onset   • Fabry's disease Mother    • Hypertension Mother    • Rheum arthritis Mother    • Arthritis Father    • Diabetes Father    • Migraines Father    • Diabetes Brother    • Migraines Brother    • Fabry's disease Brother    • Seizures Daughter    • Basal cell carcinoma Paternal Grandfather    • Colon polyps Paternal Grandfather    • Cancer Paternal Grandfather         Skin   • Diabetes Other         Grandmother   • Skin cancer Other         Grandfather, Uncle   • Stomach cancer Other         Grandfather   • Basal cell carcinoma Paternal Uncle    • Colon cancer Maternal Grandfather    • Stomach cancer Maternal Grandfather    • Cancer Maternal Grandfather         Stomach/colon   • Asthma Brother    • Diabetes Brother    • Asthma Daughter    • Cancer Maternal Grandmother         skin-basal   • Diabetes Maternal Grandmother    • Cancer Paternal Uncle         skin   • Liver cancer Neg Hx    • Liver disease Neg Hx    • Esophageal cancer Neg Hx        Review of Systems   Constitutional: Negative for chills, diaphoresis and fever.   Gastrointestinal: Negative for abdominal pain, constipation, diarrhea, nausea and vomiting.   Genitourinary: Negative for difficulty urinating, dysuria, pelvic pain, vaginal bleeding and vaginal discharge.   Musculoskeletal: Negative.    All other systems reviewed and are negative.          Objective   /80   Ht 152.4 cm (60\")   Wt 76.4 kg (168 lb 6.4 oz)   LMP 11/07/2020 (Exact Date)   Breastfeeding No   BMI 32.89 kg/m²     Physical Exam  Constitutional:       Appearance: Normal appearance. She is well-developed, well-groomed and overweight.   Chest:      Breasts: Breasts are symmetrical.         Right: No inverted nipple, mass, nipple discharge, skin change or tenderness.         Left: No " inverted nipple, mass, nipple discharge, skin change or tenderness.   Abdominal:      General: There is no distension.      Palpations: Abdomen is soft.      Tenderness: There is no abdominal tenderness.   Genitourinary:     Labia:         Right: No rash, tenderness or lesion.         Left: No rash, tenderness or lesion.       Urethra: No prolapse, urethral pain, urethral swelling or urethral lesion.      Vagina: No vaginal discharge, tenderness, bleeding or lesions.      Cervix: No cervical motion tenderness, discharge, friability, lesion, erythema or eversion.      Uterus: Not enlarged and not tender.       Adnexa:         Right: No mass or tenderness.          Left: No mass or tenderness.        Comments: Pap done  Musculoskeletal: Normal range of motion.   Skin:     General: Skin is warm and dry.      Findings: No lesion or rash.   Neurological:      General: No focal deficit present.      Mental Status: She is alert and oriented to person, place, and time.   Psychiatric:         Attention and Perception: Attention normal.         Mood and Affect: Mood normal.         Speech: Speech normal.         Behavior: Behavior is cooperative.         Thought Content: Thought content normal.              Assessment and Plan  Diagnoses and all orders for this visit:    1. Encounter for gynecological examination without abnormal finding (Primary)    2. Screening for cervical cancer  -     Liquid-based Pap Smear, Screening; Future    3. Encounter for surveillance of contraceptive pills    Other orders  -     levonorgestrel-ethinyl estradiol (Vienva) 0.1-20 MG-MCG per tablet; One tablet po daily  Dispense: 28 tablet; Refill: 12      Patient Instructions   Encourage self breast exam monthly  regular exercise               This note was electronically signed.    Ramya Rodriguez PA-C   November 20, 2020

## 2020-12-01 DIAGNOSIS — Z12.4 SCREENING FOR CERVICAL CANCER: ICD-10-CM

## 2021-01-27 NOTE — PROGRESS NOTES
Pt called and stated  her back is still itching badly. The medication loratadin did not work for her itching. Please prescribe another medication. Subjective   Dinesh Valera is a 33 y.o. female.     Sore Throat    This is a new problem. The current episode started yesterday. The problem has been gradually worsening. There has been no fever. The pain is at a severity of 1/10. Associated symptoms include congestion, coughing, ear pain, headaches, a hoarse voice, a plugged ear sensation and swollen glands. Pertinent negatives include no abdominal pain, diarrhea, drooling, ear discharge, neck pain, shortness of breath, stridor, trouble swallowing or vomiting. Associated symptoms comments: White patches in throat. She has had exposure to strep. Exposure to: daughter. Treatments tried: cough drops, tylenol. The treatment provided mild relief.        Current Outpatient Medications on File Prior to Visit   Medication Sig Dispense Refill   • [DISCONTINUED] meloxicam (MOBIC) 7.5 MG tablet Take 1 tablet by mouth Daily. Need appointment for additional refills 14 tablet 0   • [DISCONTINUED] vitamin D (ERGOCALCIFEROL) 45308 units capsule capsule Take 1 capsule by mouth 1 (One) Time Per Week. 12 capsule 3     No current facility-administered medications on file prior to visit.        Allergies   Allergen Reactions   • Norco [Hydrocodone-Acetaminophen]    • Percocet [Oxycodone-Acetaminophen]        Past Medical History:   Diagnosis Date   • Allergic     seasonal   • Anxiety    • Heart murmur    • Hypertension during pregnancy    • Hypokalemia    • Lactose intolerance    • Lactose intolerance in adult    • Migraines    • Ovarian cyst    • Scoliosis    • Seizure-like activity (CMS/HCC)    • TIA (transient ischemic attack)    • Vision problems        Past Surgical History:   Procedure Laterality Date   •  SECTION     • CHOLECYSTECTOMY     • SKIN BIOPSY      back of neck on right and right chest, benign skin lesions       Family History   Problem Relation Age of Onset   • Fabry's disease Mother    • Hypertension Mother    • Rheum arthritis Mother    •  Arthritis Father    • Diabetes Father    • Migraines Father    • Diabetes Brother    • Migraines Brother    • Fabry's disease Brother    • Seizures Daughter    • Basal cell carcinoma Paternal Grandfather    • Colon polyps Paternal Grandfather    • Cancer Paternal Grandfather         Skin   • Diabetes Other         Grandmother   • Skin cancer Other         Grandfather, Uncle   • Stomach cancer Other         Grandfather   • Basal cell carcinoma Paternal Uncle    • Colon cancer Maternal Grandfather    • Stomach cancer Maternal Grandfather    • Cancer Maternal Grandfather         Stomach/colon   • Asthma Brother    • Diabetes Brother    • Asthma Daughter    • Cancer Maternal Grandmother         skin-basal   • Diabetes Maternal Grandmother    • Cancer Paternal Uncle         skin   • Liver cancer Neg Hx    • Liver disease Neg Hx    • Esophageal cancer Neg Hx        Social History     Socioeconomic History   • Marital status:      Spouse name: Not on file   • Number of children: Not on file   • Years of education: Not on file   • Highest education level: Not on file   Social Needs   • Financial resource strain: Not on file   • Food insecurity - worry: Not on file   • Food insecurity - inability: Not on file   • Transportation needs - medical: Not on file   • Transportation needs - non-medical: Not on file   Occupational History   • Not on file   Tobacco Use   • Smoking status: Never Smoker   • Smokeless tobacco: Never Used   Substance and Sexual Activity   • Alcohol use: No   • Drug use: No   • Sexual activity: Yes     Partners: Male     Birth control/protection: None   Other Topics Concern   • Not on file   Social History Narrative   • Not on file       Review of Systems   Constitutional: Positive for activity change and appetite change. Negative for chills, diaphoresis and fever.   HENT: Positive for congestion, ear pain, hoarse voice, rhinorrhea and sore throat. Negative for drooling, ear discharge and trouble  swallowing.    Respiratory: Positive for cough. Negative for shortness of breath and stridor.    Gastrointestinal: Negative for abdominal pain, diarrhea, nausea and vomiting.   Musculoskeletal: Negative for neck pain.   Skin: Negative for rash.   Neurological: Positive for headaches.       Pulse 79   Temp 98 °F (36.7 °C)   Resp 16   Wt 70.8 kg (156 lb)   SpO2 98%   Breastfeeding? No   BMI 30.47 kg/m²     Objective   Physical Exam   Constitutional: She is oriented to person, place, and time. She appears well-developed and well-nourished. She is cooperative.   HENT:   Head: Normocephalic.   Right Ear: Tympanic membrane, external ear and ear canal normal.   Left Ear: Tympanic membrane, external ear and ear canal normal.   Nose: Right sinus exhibits no maxillary sinus tenderness and no frontal sinus tenderness. Left sinus exhibits no maxillary sinus tenderness and no frontal sinus tenderness.   Mouth/Throat: Uvula is midline and mucous membranes are normal. Posterior oropharyngeal erythema present. Tonsils are 2+ on the right. Tonsils are 2+ on the left. Tonsillar exudate.   Eyes: Conjunctivae, EOM and lids are normal.   Cardiovascular: Normal rate, regular rhythm and normal heart sounds.   Pulmonary/Chest: Effort normal and breath sounds normal. No accessory muscle usage. No respiratory distress.   Lymphadenopathy:     She has cervical adenopathy.   Neurological: She is alert and oriented to person, place, and time.   Skin: Skin is warm, dry and intact.   Psychiatric: She has a normal mood and affect. Her speech is normal and behavior is normal.         Assessment/Plan   Dinesh was seen today for sore throat.    Diagnoses and all orders for this visit:    Tonsillitis  -     amoxicillin (AMOXIL) 500 MG capsule; Take 1 capsule by mouth 2 (Two) Times a Day for 10 days.  -     fluconazole (DIFLUCAN) 150 MG tablet; Take 1 tablet by mouth 1 (One) Time for 1 dose.          Follow up with PCP or go to the nearest  emergency room if symptoms worsen or fail to improve.

## 2021-06-22 ENCOUNTER — TELEPHONE (OUTPATIENT)
Dept: SURGERY | Facility: CLINIC | Age: 36
End: 2021-06-22

## 2022-09-13 NOTE — PROGRESS NOTES
"Subjective     Chief Complaint: numbness, headaches    History of Present Illness     Dinesh Valera is a 32 y.o. female presenting with complaints of increasing headaches/migraines.  She's had similar episodes of this in the past however frequency is increasing.  Feels pain on the top of head as well as sides, never in the back or front.  When these episodes happen she pushes in and symptoms seemed to resolve.  A few years ago she had similar symptoms but more severe, doctors thought she may have had a stroke however they couldn't find any evidence on MRI so they labeled it as an atypical migraine.  EEG was also unremarkable, did not show any evidence of seizures.  They tried putting her on Topamax but came off of it as she didn't like the way it made her feel. Had discussed MRI with Dr. Villeda last fall if symptoms did not resolve.    She also notes symptoms of a couple episodes of numbness to left lower face.  Episodes last 30-45 minutes, feels like a \"cool\" sensation.  No angioedema, SOA, chest pain. No facial paralysis. No vision changes. No symptoms currently.    Patient also notes intermittent right hip pain.  Some relief with ibuprofen.  Her in the past however problem keeps returning.  Feels like her pain is gotten worse over the past several weeks.  No specific injury.    The following portions of the patient's history were reviewed and updated as appropriate: current medications, allergies, PMH.    Review of Systems   Constitutional: Negative for appetite change, chills, fatigue, fever and unexpected weight change.   HENT: Negative for congestion, ear pain, hearing loss, nosebleeds, sinus pressure, sore throat, tinnitus and trouble swallowing.    Eyes: Negative for pain, discharge, redness, itching and visual disturbance.   Respiratory: Negative for cough, chest tightness, shortness of breath and wheezing.    Cardiovascular: Negative for chest pain, palpitations and leg swelling.   Gastrointestinal: " "Negative for abdominal pain, blood in stool, constipation, diarrhea, nausea and vomiting.   Endocrine: Negative for cold intolerance, heat intolerance, polydipsia, polyphagia and polyuria.   Genitourinary: Negative for decreased urine volume, dysuria, flank pain, frequency and hematuria.   Musculoskeletal: Positive for arthralgias. Negative for back pain, gait problem, joint swelling, myalgias, neck pain and neck stiffness.   Skin: Negative for color change and rash.   Allergic/Immunologic: Negative for environmental allergies, food allergies and immunocompromised state.   Neurological: Positive for numbness and headaches. Negative for dizziness, syncope, weakness and light-headedness.   Hematological: Negative for adenopathy. Does not bruise/bleed easily.   Psychiatric/Behavioral: Negative for dysphoric mood, sleep disturbance and suicidal ideas. The patient is not nervous/anxious.        Objective     Vitals:    06/06/18 1348   BP: 112/62   Pulse: 85   Temp: 98.1 °F (36.7 °C)   SpO2: 98%   Weight: 67.6 kg (149 lb)   Height: 152.4 cm (60\")       Physical Exam   Constitutional: She is oriented to person, place, and time. She appears well-developed and well-nourished.   HENT:   Nose: Nose normal.   Mouth/Throat: Oropharynx is clear and moist.   Eyes: EOM are normal. Pupils are equal, round, and reactive to light.   Neck: Normal range of motion. Neck supple.   Cardiovascular: Normal rate, regular rhythm and normal heart sounds.    Pulmonary/Chest: Effort normal and breath sounds normal.   Abdominal: Soft. Bowel sounds are normal.   Musculoskeletal: Normal range of motion.   Lymphadenopathy:     She has no cervical adenopathy.   Neurological: She is alert and oriented to person, place, and time. She has normal strength. No cranial nerve deficit or sensory deficit. She displays a negative Romberg sign. GCS eye subscore is 4. GCS verbal subscore is 5. GCS motor subscore is 6.   Skin: Skin is warm and dry.   Psychiatric: " [Care Plan reviewed and provided to patient/caregiver] : Care plan reviewed and provided to patient/caregiver She has a normal mood and affect.     Assessment/Plan     Diagnoses and all orders for this visit:    Atypical migraine  -     MRI Brain With Contrast; Future  -     Ambulatory Referral to Neurology    History of TIA (transient ischemic attack)  -     MRI Brain With Contrast; Future  -     Ambulatory Referral to Neurology    Facial numbness  -     MRI Brain With Contrast; Future  -     Ambulatory Referral to Neurology    Right hip pain  -     meloxicam (MOBIC) 7.5 MG tablet; Take 1 tablet by mouth Daily. With food.    Vitamin D deficiency  -     vitamin D (ERGOCALCIFEROL) 81006 units capsule capsule; Take 1 capsule by mouth 1 (One) Time Per Week.    F/u with Dr. Villeda in 4 weeks.    Kelsey Chavis PA-C  06/06/2018         Please note that portions of this note were completed with a voice recognition program. Efforts were made to edit dictation, but occasionally words are mistranscribed.     [Care Plan reviewed every ___ weeks] : Care plan reviewed every [unfilled] weeks [Understands and communicates without difficulty] : Patient/Caregiver understands and communicates without difficulty [FreeTextEntry2] : PROMOTE SAFETY, GOOD NUTRITIONAL AND SLEEP HABITS\par  [FreeTextEntry3] : RECOMMEND 5 FRUITS AND VEGETABLES DAILY, 2 HOURS OF PHYSICAL ACTIVITY DAILY, ONLY 1 HOUR OF SCREEN TIME EXCEPT FOR HOMEWORK, ZERO SWEETENED BEVERAGES. REDUCE RISK TAKING BEHAVIOR.\par

## 2023-08-17 ENCOUNTER — OFFICE VISIT (OUTPATIENT)
Dept: OBSTETRICS AND GYNECOLOGY | Facility: CLINIC | Age: 38
End: 2023-08-17
Payer: COMMERCIAL

## 2023-08-17 VITALS
SYSTOLIC BLOOD PRESSURE: 130 MMHG | DIASTOLIC BLOOD PRESSURE: 90 MMHG | BODY MASS INDEX: 39.5 KG/M2 | HEIGHT: 60 IN | WEIGHT: 201.2 LBS

## 2023-08-17 DIAGNOSIS — Z12.4 SCREENING FOR CERVICAL CANCER: ICD-10-CM

## 2023-08-17 DIAGNOSIS — Z01.419 ROUTINE GYNECOLOGICAL EXAMINATION: Primary | ICD-10-CM

## 2023-08-17 DIAGNOSIS — Z12.31 ENCOUNTER FOR SCREENING MAMMOGRAM FOR MALIGNANT NEOPLASM OF BREAST: ICD-10-CM

## 2023-08-17 RX ORDER — LOSARTAN POTASSIUM AND HYDROCHLOROTHIAZIDE 12.5; 5 MG/1; MG/1
1 TABLET ORAL DAILY
COMMUNITY
Start: 2023-07-06

## 2023-08-17 RX ORDER — LEVOTHYROXINE SODIUM 0.05 MG/1
50 TABLET ORAL DAILY
COMMUNITY
Start: 2023-07-06

## 2023-08-17 RX ORDER — ESCITALOPRAM OXALATE 10 MG/1
10 TABLET ORAL DAILY
COMMUNITY
Start: 2020-05-01

## 2023-08-17 NOTE — PROGRESS NOTES
Subjective   Chief Complaint   Patient presents with    Gynecologic Exam     Last pap done 20-WNL, C/O spot on right breast       Dinesh Valera is a 38 y.o. year old  presenting to be seen for her annual gynecological exam.   She noted a spot on skin of right breast about 2 weeks ago. Spot is not bothersome or irritated  Has not felt any breast lumps. She would like to schedule a screening mammogram   LMP 2023. Cycles regular. Condoms and coitus interruptus for birth control      Past Medical History:   Diagnosis Date    Abnormal Pap smear of cervix     Allergic     seasonal    Anxiety     Cervical dysplasia     Gestational hypertension     Heart murmur     HPV (human papilloma virus) infection     Hypertension during pregnancy     Hypokalemia     Lactose intolerance     Lactose intolerance in adult     Migraines     Ovarian cyst     PMS (premenstrual syndrome)     PONV (postoperative nausea and vomiting)     Preeclampsia     Scoliosis     Seizure-like activity     Stroke     Like symptoms, CT showed nothing. ruled as a migraine.    TIA (transient ischemic attack)     Ulcerative colitis 2019    Urogenital trichomoniasis 2017?    Varicella     Vision problems 2015        Current Outpatient Medications:     escitalopram (LEXAPRO) 10 MG tablet, Take 1 tablet by mouth Daily., Disp: , Rfl:     levothyroxine (SYNTHROID, LEVOTHROID) 50 MCG tablet, Take 1 tablet by mouth Daily., Disp: , Rfl:     losartan-hydrochlorothiazide (HYZAAR) 50-12.5 MG per tablet, Take 1 tablet by mouth Daily., Disp: , Rfl:    Allergies   Allergen Reactions    Oxycodone-Acetaminophen Nausea And Vomiting    Norco [Hydrocodone-Acetaminophen] Nausea And Vomiting      Past Surgical History:   Procedure Laterality Date     SECTION  2012    CHOLECYSTECTOMY  2014    LAPAROSCOPIC CHOLECYSTECTOMY  2014    SKIN BIOPSY      back of neck on right and right chest, benign skin lesions    WISDOM TOOTH EXTRACTION  2019     "  Social History     Socioeconomic History    Marital status:    Tobacco Use    Smoking status: Never    Smokeless tobacco: Never   Vaping Use    Vaping Use: Never used   Substance and Sexual Activity    Alcohol use: No    Drug use: No    Sexual activity: Not Currently     Partners: Male     Birth control/protection: Condom, Rhythm, Coitus interruptus      Family History   Problem Relation Age of Onset    Fabry's disease Mother     Hypertension Mother     Rheum arthritis Mother     Arthritis Father     Diabetes Father     Migraines Father     Diabetes Brother     Migraines Brother     Fabry's disease Brother     Seizures Daughter     Basal cell carcinoma Paternal Grandfather     Colon polyps Paternal Grandfather     Cancer Paternal Grandfather         Skin    Melanoma Paternal Grandfather     Diabetes Other         Grandmother    Skin cancer Other         Grandfather, Uncle    Stomach cancer Other         Grandfather    Basal cell carcinoma Paternal Uncle     Colon cancer Maternal Grandfather     Stomach cancer Maternal Grandfather     Cancer Maternal Grandfather         Stomach/colon    Asthma Brother     Diabetes Brother     Asthma Daughter     Cancer Maternal Grandmother         skin-basal    Diabetes Maternal Grandmother     Melanoma Maternal Grandmother     Cancer Paternal Uncle         skin    Melanoma Paternal Uncle     Breast cancer Paternal Aunt         x3 (2 female, 1 male)    Liver cancer Neg Hx     Liver disease Neg Hx     Esophageal cancer Neg Hx        Review of Systems   Constitutional:  Negative for chills, diaphoresis and fever.   Gastrointestinal:  Negative for constipation, diarrhea, nausea and vomiting.   Genitourinary:  Negative for difficulty urinating, dysuria, menstrual problem and pelvic pain.         Objective   /90   Ht 152.4 cm (60\")   Wt 91.3 kg (201 lb 3.2 oz)   LMP 07/20/2023 (Approximate)   BMI 39.29 kg/mý     Physical Exam  Exam conducted with a chaperone present. "   Constitutional:       Appearance: Normal appearance. She is well-developed and well-groomed.   Eyes:      General: Lids are normal.      Extraocular Movements: Extraocular movements intact.      Conjunctiva/sclera: Conjunctivae normal.   Neck:      Thyroid: No thyroid mass.   Chest:   Breasts:     Breasts are symmetrical.      Right: No inverted nipple, mass, nipple discharge, skin change or tenderness.      Left: No inverted nipple, mass, nipple discharge, skin change or tenderness.          Comments: Small less than 1 cm area of pink discoloration which when blanched with pressure goes away and appears consistent with small blood vessels under skin  Abdominal:      General: There is no distension.      Palpations: Abdomen is soft. There is no hepatomegaly or splenomegaly.      Tenderness: There is no abdominal tenderness.   Genitourinary:     Exam position: Lithotomy position.      Labia:         Right: No rash, tenderness or lesion.         Left: No rash, tenderness or lesion.       Urethra: No prolapse, urethral pain, urethral swelling or urethral lesion.      Vagina: No vaginal discharge, tenderness or lesions.      Cervix: No cervical motion tenderness, discharge, friability or lesion.      Uterus: Not enlarged and not tender.       Adnexa:         Right: No mass or tenderness.          Left: No mass or tenderness.     Musculoskeletal:      Cervical back: Neck supple.   Lymphadenopathy:      Upper Body:      Right upper body: No axillary adenopathy.      Left upper body: No axillary adenopathy.   Skin:     General: Skin is warm and dry.      Findings: No lesion.   Neurological:      General: No focal deficit present.      Mental Status: She is alert and oriented to person, place, and time.   Psychiatric:         Attention and Perception: Attention normal.         Mood and Affect: Mood normal.         Speech: Speech normal.         Behavior: Behavior normal.         Thought Content: Thought content normal.           Result Review :                   Assessment and Plan  Diagnoses and all orders for this visit:    1. Routine gynecological examination (Primary)    2. Screening for cervical cancer  -     LIQUID-BASED PAP SMEAR WITH HPV GENOTYPING REGARDLESS OF INTERPRETATION (SUAD,COR,MAD)    3. Encounter for screening mammogram for malignant neoplasm of breast  -     Mammo Screening Digital Tomosynthesis Bilateral With CAD; Future      Patient Instructions   Self breast exam monthly  Regular exercise             This note was electronically signed.    Ramya Rodriguez PA-C   August 17, 2023

## 2023-08-21 LAB — REF LAB TEST METHOD: NORMAL

## 2023-09-14 ENCOUNTER — HOSPITAL ENCOUNTER (OUTPATIENT)
Dept: MAMMOGRAPHY | Facility: HOSPITAL | Age: 38
Discharge: HOME | End: 2023-09-14
Admitting: PHYSICIAN ASSISTANT
Payer: COMMERCIAL

## 2023-09-14 DIAGNOSIS — Z12.31 ENCOUNTER FOR SCREENING MAMMOGRAM FOR MALIGNANT NEOPLASM OF BREAST: ICD-10-CM

## 2023-09-14 PROCEDURE — 77067 SCR MAMMO BI INCL CAD: CPT

## 2023-09-14 PROCEDURE — 77063 BREAST TOMOSYNTHESIS BI: CPT

## 2023-09-20 ENCOUNTER — TRANSCRIBE ORDERS (OUTPATIENT)
Dept: OBSTETRICS AND GYNECOLOGY | Facility: CLINIC | Age: 38
End: 2023-09-20
Payer: COMMERCIAL

## 2023-09-20 DIAGNOSIS — R92.8 ABNORMAL MAMMOGRAM OF RIGHT BREAST: Primary | ICD-10-CM

## 2023-11-08 ENCOUNTER — HOSPITAL ENCOUNTER (OUTPATIENT)
Dept: MAMMOGRAPHY | Facility: HOSPITAL | Age: 38
Discharge: HOME | End: 2023-11-08
Admitting: PHYSICIAN ASSISTANT
Payer: COMMERCIAL

## 2023-11-08 ENCOUNTER — HOSPITAL ENCOUNTER (OUTPATIENT)
Dept: ULTRASOUND IMAGING | Facility: HOSPITAL | Age: 38
Discharge: HOME | End: 2023-11-08
Payer: COMMERCIAL

## 2023-11-08 DIAGNOSIS — R92.8 ABNORMAL MAMMOGRAM OF RIGHT BREAST: ICD-10-CM

## 2023-11-08 PROCEDURE — 76642 ULTRASOUND BREAST LIMITED: CPT

## 2023-11-08 PROCEDURE — 77065 DX MAMMO INCL CAD UNI: CPT

## 2023-11-08 PROCEDURE — G0279 TOMOSYNTHESIS, MAMMO: HCPCS

## 2024-01-19 ENCOUNTER — TELEPHONE (OUTPATIENT)
Dept: OBSTETRICS AND GYNECOLOGY | Facility: CLINIC | Age: 39
End: 2024-01-19

## 2024-01-19 NOTE — TELEPHONE ENCOUNTER
Caller: Dinesh Valera    Relationship to patient: Self    Best call back number: [phone]    Patient is needing:    PATIENT CALLED AND CANCELED SAME DAY APPT FOR TODAY.

## 2024-01-22 ENCOUNTER — OFFICE VISIT (OUTPATIENT)
Dept: OBSTETRICS AND GYNECOLOGY | Facility: CLINIC | Age: 39
End: 2024-01-22
Payer: COMMERCIAL

## 2024-01-22 VITALS
SYSTOLIC BLOOD PRESSURE: 100 MMHG | BODY MASS INDEX: 34.63 KG/M2 | DIASTOLIC BLOOD PRESSURE: 68 MMHG | WEIGHT: 176.4 LBS | HEIGHT: 60 IN

## 2024-01-22 DIAGNOSIS — N89.8 VAGINAL ITCHING: Primary | ICD-10-CM

## 2024-01-22 PROCEDURE — 99212 OFFICE O/P EST SF 10 MIN: CPT | Performed by: PHYSICIAN ASSISTANT

## 2024-01-22 RX ORDER — SEMAGLUTIDE 1.34 MG/ML
INJECTION, SOLUTION SUBCUTANEOUS
COMMUNITY
Start: 2023-12-12

## 2024-01-22 NOTE — PROGRESS NOTES
Subjective   Chief Complaint   Patient presents with    Vaginal Discharge     Possible yeast infection       Dinesh Valera is a 38 y.o. year old  presenting to be seen for possible yeast infection.  Had developed vaginal itching and used Monistat 1 2-3 weeks ago. Symptoms continued and then she used Monistat 7. Symptoms improved but then she had menses right after finishing Monistat 7. Now with mild itching again  Hx trichomonas. Recently . Wants screened for everything    Past Medical History:   Diagnosis Date    Abnormal Pap smear of cervix     Allergic     seasonal    Anxiety     Cervical dysplasia     Gestational hypertension     Heart murmur     HPV (human papilloma virus) infection     Hypertension during pregnancy     Hypokalemia     Lactose intolerance     Lactose intolerance in adult     Migraines     Ovarian cyst     PMS (premenstrual syndrome)     PONV (postoperative nausea and vomiting)     Preeclampsia     Scoliosis     Seizure-like activity     Seizure-like activity 2016    Stroke     Like symptoms, CT showed nothing. ruled as a migraine.    TIA (transient ischemic attack)     Ulcerative colitis 2019    Urogenital trichomoniasis 2017?    Varicella     Vision problems 2015        Current Outpatient Medications:     escitalopram (LEXAPRO) 10 MG tablet, Take 1 tablet by mouth Daily., Disp: , Rfl:     levothyroxine (SYNTHROID, LEVOTHROID) 50 MCG tablet, Take 1 tablet by mouth Daily., Disp: , Rfl:     losartan-hydrochlorothiazide (HYZAAR) 50-12.5 MG per tablet, Take 1 tablet by mouth Daily., Disp: , Rfl:     Semaglutide,0.25 or 0.5MG/DOS, (Ozempic, 0.25 or 0.5 MG/DOSE,) 2 MG/1.5ML solution pen-injector, , Disp: , Rfl:    Allergies   Allergen Reactions    Oxycodone-Acetaminophen Nausea And Vomiting    Norco [Hydrocodone-Acetaminophen] Nausea And Vomiting      Past Surgical History:   Procedure Laterality Date     SECTION      CHOLECYSTECTOMY      LAPAROSCOPIC  "CHOLECYSTECTOMY  2014    SKIN BIOPSY      back of neck on right and right chest, benign skin lesions    WISDOM TOOTH EXTRACTION  2019      Social History     Socioeconomic History    Marital status:    Tobacco Use    Smoking status: Never    Smokeless tobacco: Never   Vaping Use    Vaping Use: Never used   Substance and Sexual Activity    Alcohol use: No    Drug use: No    Sexual activity: Yes     Partners: Male     Birth control/protection: Condom, Coitus interruptus, Rhythm      Family History   Problem Relation Age of Onset    Fabry's disease Mother     Hypertension Mother     Rheum arthritis Mother     Arthritis Father     Diabetes Father     Migraines Father     Diabetes Brother     Migraines Brother     Fabry's disease Brother     Seizures Daughter     Basal cell carcinoma Paternal Grandfather     Colon polyps Paternal Grandfather     Cancer Paternal Grandfather         Skin    Melanoma Paternal Grandfather     Diabetes Other         Grandmother    Skin cancer Other         Grandfather, Uncle    Stomach cancer Other         Grandfather    Basal cell carcinoma Paternal Uncle     Colon cancer Maternal Grandfather     Stomach cancer Maternal Grandfather     Cancer Maternal Grandfather         Stomach/colon    Asthma Brother     Diabetes Brother     Asthma Daughter     Cancer Maternal Grandmother         skin-basal    Diabetes Maternal Grandmother     Melanoma Maternal Grandmother     Cancer Paternal Uncle         skin    Melanoma Paternal Uncle     Breast cancer Paternal Aunt         x3 (2 female, 1 male)    Liver cancer Neg Hx     Liver disease Neg Hx     Esophageal cancer Neg Hx        Review of Systems   Constitutional:  Negative for chills, diaphoresis and fever.   Gastrointestinal:  Negative for constipation, diarrhea, nausea and vomiting.   Genitourinary:  Negative for difficulty urinating, dysuria, pelvic pain and vaginal discharge.           Objective   /68   Ht 152.4 cm (60\")   Wt 80 kg " (176 lb 6.4 oz)   LMP 01/12/2024 (Exact Date)   BMI 34.45 kg/m²     Physical Exam  Exam conducted with a chaperone present.   Constitutional:       Appearance: Normal appearance. She is well-developed and well-groomed.   Eyes:      General: Lids are normal.      Extraocular Movements: Extraocular movements intact.      Conjunctiva/sclera: Conjunctivae normal.   Genitourinary:     Labia:         Right: No rash, tenderness, lesion or injury.         Left: No rash, tenderness, lesion or injury.       Urethra: No prolapse, urethral pain, urethral swelling or urethral lesion.      Vagina: No vaginal discharge, tenderness or lesions.      Cervix: No cervical motion tenderness, discharge, friability or lesion.      Uterus: Not enlarged and not tender.    Neurological:      Mental Status: She is alert.   Psychiatric:         Attention and Perception: Attention normal.         Mood and Affect: Mood normal.         Speech: Speech normal.         Behavior: Behavior is cooperative.            Result Review :                   Assessment and Plan  Diagnoses and all orders for this visit:    1. Vaginal itching (Primary)  -     NuSwab VG+ - Swab, Vagina      Patient Instructions   Reassured no concerning findings on exam  Will contact with results of swab when available           This note was electronically signed.    Ramya Rodriguez PA-C   January 22, 2024

## 2024-01-24 LAB
A VAGINAE DNA VAG QL NAA+PROBE: NORMAL SCORE
BVAB2 DNA VAG QL NAA+PROBE: NORMAL SCORE
C ALBICANS DNA VAG QL NAA+PROBE: NEGATIVE
C GLABRATA DNA VAG QL NAA+PROBE: NEGATIVE
C TRACH DNA VAG QL NAA+PROBE: NEGATIVE
MEGA1 DNA VAG QL NAA+PROBE: NORMAL SCORE
N GONORRHOEA DNA VAG QL NAA+PROBE: NEGATIVE
T VAGINALIS DNA VAG QL NAA+PROBE: NEGATIVE